# Patient Record
Sex: FEMALE | Race: BLACK OR AFRICAN AMERICAN | NOT HISPANIC OR LATINO | Employment: FULL TIME | URBAN - METROPOLITAN AREA
[De-identification: names, ages, dates, MRNs, and addresses within clinical notes are randomized per-mention and may not be internally consistent; named-entity substitution may affect disease eponyms.]

---

## 2017-06-21 ENCOUNTER — RECORDS - HEALTHEAST (OUTPATIENT)
Dept: LAB | Facility: CLINIC | Age: 62
End: 2017-06-21

## 2017-06-21 LAB
CHOLEST SERPL-MCNC: 207 MG/DL
FASTING STATUS PATIENT QL REPORTED: ABNORMAL
HDLC SERPL-MCNC: 54 MG/DL
LDLC SERPL CALC-MCNC: 145 MG/DL
TRIGL SERPL-MCNC: 39 MG/DL

## 2017-07-05 RX ORDER — ALBUTEROL SULFATE 90 UG/1
2 AEROSOL, METERED RESPIRATORY (INHALATION) EVERY 4 HOURS PRN
COMMUNITY

## 2017-07-05 RX ORDER — ALBUTEROL SULFATE 1.25 MG/3ML
1 SOLUTION RESPIRATORY (INHALATION) EVERY 6 HOURS PRN
COMMUNITY

## 2017-07-06 ENCOUNTER — ANESTHESIA (OUTPATIENT)
Dept: SURGERY | Facility: CLINIC | Age: 62
End: 2017-07-06
Payer: COMMERCIAL

## 2017-07-06 ENCOUNTER — ANESTHESIA EVENT (OUTPATIENT)
Dept: SURGERY | Facility: CLINIC | Age: 62
End: 2017-07-06
Payer: COMMERCIAL

## 2017-07-06 ENCOUNTER — SURGERY (OUTPATIENT)
Age: 62
End: 2017-07-06

## 2017-07-06 ENCOUNTER — HOSPITAL ENCOUNTER (OUTPATIENT)
Facility: CLINIC | Age: 62
Discharge: HOME | End: 2017-07-06
Attending: OPHTHALMOLOGY | Admitting: OPHTHALMOLOGY
Payer: COMMERCIAL

## 2017-07-06 VITALS
SYSTOLIC BLOOD PRESSURE: 126 MMHG | TEMPERATURE: 98 F | WEIGHT: 184 LBS | HEIGHT: 62 IN | OXYGEN SATURATION: 98 % | DIASTOLIC BLOOD PRESSURE: 75 MMHG | HEART RATE: 69 BPM | BODY MASS INDEX: 33.86 KG/M2 | RESPIRATION RATE: 16 BRPM

## 2017-07-06 DIAGNOSIS — H25.11 AGE-RELATED NUCLEAR CATARACT OF RIGHT EYE: Primary | ICD-10-CM

## 2017-07-06 PROCEDURE — 71000028 ZZH EYE RECOVERY PHASE 2 EACH 15 MINS: Performed by: OPHTHALMOLOGY

## 2017-07-06 PROCEDURE — 37000008 ZZH ANESTHESIA TECHNICAL FEE, 1ST 30 MIN: Performed by: OPHTHALMOLOGY

## 2017-07-06 PROCEDURE — 40000170 ZZH STATISTIC PRE-PROCEDURE ASSESSMENT II: Performed by: OPHTHALMOLOGY

## 2017-07-06 PROCEDURE — V2632 POST CHMBR INTRAOCULAR LENS: HCPCS | Performed by: OPHTHALMOLOGY

## 2017-07-06 PROCEDURE — 25000128 H RX IP 250 OP 636: Performed by: ANESTHESIOLOGY

## 2017-07-06 PROCEDURE — 36000104 ZZH EYE SURGERY LEVEL 4 1ST 30 MIN: Performed by: OPHTHALMOLOGY

## 2017-07-06 PROCEDURE — 25000128 H RX IP 250 OP 636: Performed by: OPHTHALMOLOGY

## 2017-07-06 PROCEDURE — 37000009 ZZH ANESTHESIA TECHNICAL FEE, EACH ADDTL 15 MIN: Performed by: OPHTHALMOLOGY

## 2017-07-06 PROCEDURE — 36000105 ZZH EYE SURGERY LEVEL 4 EA 15 ADDTL MIN: Performed by: OPHTHALMOLOGY

## 2017-07-06 PROCEDURE — 27210995 ZZH RX 272: Performed by: OPHTHALMOLOGY

## 2017-07-06 PROCEDURE — 25000125 ZZHC RX 250: Performed by: ANESTHESIOLOGY

## 2017-07-06 PROCEDURE — 27210794 ZZH OR GENERAL SUPPLY STERILE: Performed by: OPHTHALMOLOGY

## 2017-07-06 PROCEDURE — 25000128 H RX IP 250 OP 636: Performed by: NURSE ANESTHETIST, CERTIFIED REGISTERED

## 2017-07-06 PROCEDURE — 66711 ECP CILIARY BODY DESTRUCTION: CPT | Performed by: OPHTHALMOLOGY

## 2017-07-06 PROCEDURE — 25000125 ZZHC RX 250: Performed by: OPHTHALMOLOGY

## 2017-07-06 PROCEDURE — 25000132 ZZH RX MED GY IP 250 OP 250 PS 637: Performed by: OPHTHALMOLOGY

## 2017-07-06 DEVICE — EYE IMP IOL ALCON PCL SN60WF ACRYSOF IQ 21.0: Type: IMPLANTABLE DEVICE | Site: EYE | Status: FUNCTIONAL

## 2017-07-06 RX ORDER — FENTANYL CITRATE 50 UG/ML
INJECTION, SOLUTION INTRAMUSCULAR; INTRAVENOUS PRN
Status: DISCONTINUED | OUTPATIENT
Start: 2017-07-06 | End: 2017-07-06

## 2017-07-06 RX ORDER — TROPICAMIDE 10 MG/ML
1 SOLUTION/ DROPS OPHTHALMIC
Status: COMPLETED | OUTPATIENT
Start: 2017-07-06 | End: 2017-07-06

## 2017-07-06 RX ORDER — BALANCED SALT SOLUTION 6.4; .75; .48; .3; 3.9; 1.7 MG/ML; MG/ML; MG/ML; MG/ML; MG/ML; MG/ML
SOLUTION OPHTHALMIC PRN
Status: DISCONTINUED | OUTPATIENT
Start: 2017-07-06 | End: 2017-07-06 | Stop reason: HOSPADM

## 2017-07-06 RX ORDER — SODIUM CHLORIDE, SODIUM LACTATE, POTASSIUM CHLORIDE, CALCIUM CHLORIDE 600; 310; 30; 20 MG/100ML; MG/100ML; MG/100ML; MG/100ML
500 INJECTION, SOLUTION INTRAVENOUS CONTINUOUS
Status: DISCONTINUED | OUTPATIENT
Start: 2017-07-06 | End: 2017-07-06 | Stop reason: HOSPADM

## 2017-07-06 RX ORDER — ACETAMINOPHEN 325 MG/1
650 TABLET ORAL ONCE
Status: COMPLETED | OUTPATIENT
Start: 2017-07-06 | End: 2017-07-06

## 2017-07-06 RX ORDER — ONDANSETRON 2 MG/ML
INJECTION INTRAMUSCULAR; INTRAVENOUS PRN
Status: DISCONTINUED | OUTPATIENT
Start: 2017-07-06 | End: 2017-07-06

## 2017-07-06 RX ORDER — PHENYLEPHRINE HYDROCHLORIDE 25 MG/ML
1 SOLUTION/ DROPS OPHTHALMIC
Status: DISCONTINUED | OUTPATIENT
Start: 2017-07-06 | End: 2017-07-06 | Stop reason: HOSPADM

## 2017-07-06 RX ORDER — TETRACAINE HYDROCHLORIDE 5 MG/ML
SOLUTION OPHTHALMIC PRN
Status: DISCONTINUED | OUTPATIENT
Start: 2017-07-06 | End: 2017-07-06 | Stop reason: HOSPADM

## 2017-07-06 RX ORDER — OFLOXACIN 3 MG/ML
1 SOLUTION/ DROPS OPHTHALMIC 4 TIMES DAILY
Qty: 1 BOTTLE | Refills: 0 | Status: SHIPPED | OUTPATIENT
Start: 2017-07-06

## 2017-07-06 RX ORDER — PREDNISOLONE ACETATE 10 MG/ML
1 SUSPENSION/ DROPS OPHTHALMIC 4 TIMES DAILY
Qty: 6 ML | Refills: 0 | Status: SHIPPED | OUTPATIENT
Start: 2017-07-06 | End: 2017-08-05

## 2017-07-06 RX ORDER — LIDOCAINE HYDROCHLORIDE 10 MG/ML
INJECTION, SOLUTION EPIDURAL; INFILTRATION; INTRACAUDAL; PERINEURAL PRN
Status: DISCONTINUED | OUTPATIENT
Start: 2017-07-06 | End: 2017-07-06 | Stop reason: HOSPADM

## 2017-07-06 RX ORDER — TRIAMCINOLONE ACETONIDE 40 MG/ML
INJECTION, SUSPENSION INTRA-ARTICULAR; INTRAMUSCULAR PRN
Status: DISCONTINUED | OUTPATIENT
Start: 2017-07-06 | End: 2017-07-06 | Stop reason: HOSPADM

## 2017-07-06 RX ORDER — SODIUM CHLORIDE, SODIUM LACTATE, POTASSIUM CHLORIDE, CALCIUM CHLORIDE 600; 310; 30; 20 MG/100ML; MG/100ML; MG/100ML; MG/100ML
INJECTION, SOLUTION INTRAVENOUS CONTINUOUS
Status: DISCONTINUED | OUTPATIENT
Start: 2017-07-06 | End: 2017-07-06 | Stop reason: HOSPADM

## 2017-07-06 RX ORDER — TROPICAMIDE 10 MG/ML
1 SOLUTION/ DROPS OPHTHALMIC
Status: DISCONTINUED | OUTPATIENT
Start: 2017-07-06 | End: 2017-07-06 | Stop reason: HOSPADM

## 2017-07-06 RX ORDER — DEXAMETHASONE SODIUM PHOSPHATE 4 MG/ML
INJECTION, SOLUTION INTRA-ARTICULAR; INTRALESIONAL; INTRAMUSCULAR; INTRAVENOUS; SOFT TISSUE PRN
Status: DISCONTINUED | OUTPATIENT
Start: 2017-07-06 | End: 2017-07-06 | Stop reason: HOSPADM

## 2017-07-06 RX ADMIN — TETRACAINE HYDROCHLORIDE 2 DROP: 5 SOLUTION OPHTHALMIC at 11:15

## 2017-07-06 RX ADMIN — EPINEPHRINE 500 ML: 1 INJECTION, SOLUTION, CONCENTRATE INTRAVENOUS at 10:51

## 2017-07-06 RX ADMIN — DEXAMETHASONE SODIUM PHOSPHATE 2 MG: 4 INJECTION, SOLUTION INTRA-ARTICULAR; INTRALESIONAL; INTRAMUSCULAR; INTRAVENOUS; SOFT TISSUE at 10:52

## 2017-07-06 RX ADMIN — CEFTAZIDIME 0.5 ML: 1 INJECTION, POWDER, FOR SOLUTION INTRAMUSCULAR; INTRAVENOUS at 10:52

## 2017-07-06 RX ADMIN — SODIUM CHLORIDE, POTASSIUM CHLORIDE, SODIUM LACTATE AND CALCIUM CHLORIDE 500 ML: 600; 310; 30; 20 INJECTION, SOLUTION INTRAVENOUS at 08:52

## 2017-07-06 RX ADMIN — TRIAMCINOLONE ACETONIDE 4 MG: 40 INJECTION, SUSPENSION INTRA-ARTICULAR; INTRAMUSCULAR at 11:11

## 2017-07-06 RX ADMIN — NEOMYCIN SULFATE, POLYMYXIN B SULFATE, AND DEXAMETHASONE 1 TUBE: 3.5; 10000; 1 OINTMENT OPHTHALMIC at 11:16

## 2017-07-06 RX ADMIN — TROPICAMIDE 1 DROP: 10 SOLUTION/ DROPS OPHTHALMIC at 08:50

## 2017-07-06 RX ADMIN — FENTANYL CITRATE 25 MCG: 50 INJECTION, SOLUTION INTRAMUSCULAR; INTRAVENOUS at 10:38

## 2017-07-06 RX ADMIN — MIDAZOLAM HYDROCHLORIDE 2 MG: 1 INJECTION, SOLUTION INTRAMUSCULAR; INTRAVENOUS at 10:29

## 2017-07-06 RX ADMIN — LIDOCAINE HYDROCHLORIDE 1 ML: 10 INJECTION, SOLUTION EPIDURAL; INFILTRATION; INTRACAUDAL; PERINEURAL at 11:16

## 2017-07-06 RX ADMIN — TROPICAMIDE 1 DROP: 10 SOLUTION/ DROPS OPHTHALMIC at 08:39

## 2017-07-06 RX ADMIN — LIDOCAINE HYDROCHLORIDE 1 ML: 10 INJECTION, SOLUTION EPIDURAL; INFILTRATION; INTRACAUDAL; PERINEURAL at 08:52

## 2017-07-06 RX ADMIN — FENTANYL CITRATE 50 MCG: 50 INJECTION, SOLUTION INTRAMUSCULAR; INTRAVENOUS at 10:34

## 2017-07-06 RX ADMIN — PHENYLEPHRINE HYDROCHLORIDE 1 DROP: 2.5 SOLUTION/ DROPS OPHTHALMIC at 08:39

## 2017-07-06 RX ADMIN — ONDANSETRON 4 MG: 2 INJECTION INTRAMUSCULAR; INTRAVENOUS at 10:36

## 2017-07-06 RX ADMIN — TROPICAMIDE 1 DROP: 10 SOLUTION/ DROPS OPHTHALMIC at 08:44

## 2017-07-06 RX ADMIN — ACETAMINOPHEN 650 MG: 325 TABLET, FILM COATED ORAL at 11:28

## 2017-07-06 RX ADMIN — PHENYLEPHRINE HYDROCHLORIDE 1 DROP: 2.5 SOLUTION/ DROPS OPHTHALMIC at 08:50

## 2017-07-06 RX ADMIN — Medication 0.8 ML: at 11:15

## 2017-07-06 RX ADMIN — PHENYLEPHRINE HYDROCHLORIDE 1 DROP: 2.5 SOLUTION/ DROPS OPHTHALMIC at 08:44

## 2017-07-06 RX ADMIN — BALANCED SALT SOLUTION 15 ML: 6.4; .75; .48; .3; 3.9; 1.7 SOLUTION OPHTHALMIC at 10:51

## 2017-07-06 ASSESSMENT — LIFESTYLE VARIABLES: TOBACCO_USE: 0

## 2017-07-06 ASSESSMENT — COPD QUESTIONNAIRES: COPD: 0

## 2017-07-06 NOTE — IP AVS SNAPSHOT
[facility]    [address]    [place] [address]    Phone:  [phone]    Fax:  [phone]                                       After Visit Summary   7/6/2017    Guillermina Lopez    MRN: [id number]           After Visit Summary Signature Page     I have received my discharge instructions, and my questions have been answered. I have discussed any challenges I see with this plan with the nurse or doctor.    ..........................................................................................................................................  Patient/Patient Representative Signature      ..........................................................................................................................................  Patient Representative Print Name and Relationship to Patient    ..................................................               ................................................  Date                                            Time    ..........................................................................................................................................  Reviewed by Signature/Title    ...................................................              ..............................................  Date                                                            Time

## 2017-07-06 NOTE — ANESTHESIA POSTPROCEDURE EVALUATION
Patient: Guillermina Lopez    Procedure(s):  RIGHT EYE PHACOEMULSIFICATION CLEAR CORNEA WITH STANDARD INTRAOCULAR LENS IMPLANT; ENDOSCOPIC CYCLOPHOTOCOAGULATION (MAC RETROBULBAR) - Wound Class: I-Clean    Diagnosis:CATARACT  Diagnosis Additional Information: No value filed.    Anesthesia Type:  MAC    Note:  Anesthesia Post Evaluation    Patient location during evaluation: PACU  Patient participation: Able to fully participate in evaluation  Level of consciousness: awake and alert  Pain management: adequate  Airway patency: patent  Cardiovascular status: acceptable  Respiratory status: acceptable  Hydration status: acceptable  PONV: none     Anesthetic complications: None          Last vitals:  Vitals:    07/06/17 0846 07/06/17 1117 07/06/17 1142   BP: 117/84 133/79 126/75   Pulse: 69     Resp: 14 16 16   Temp: 36.7  C (98  F)     SpO2: 100% 98% 98%         Electronically Signed By: Robbie Benz MD  July 6, 2017  3:29 PM

## 2017-07-06 NOTE — ANESTHESIA CARE TRANSFER NOTE
Patient: Guillermina Lopez    Procedure(s):  RIGHT EYE PHACOEMULSIFICATION CLEAR CORNEA WITH STANDARD INTRAOCULAR LENS IMPLANT; ENDOSCOPIC CYCLOPHOTOCOAGULATION (MAC RETROBULBAR) - Wound Class: I-Clean    Diagnosis: CATARACT  Diagnosis Additional Information: No value filed.    Anesthesia Type:   MAC     Note:  Airway :Room Air  Patient transferred to:PACU  Comments: Pt exhibits spont resps, all monitors and alarms on in pacu, report given to RN, vss.      Vitals: (Last set prior to Anesthesia Care Transfer)    CRNA VITALS  7/6/2017 1041 - 7/6/2017 1116      7/6/2017             NIBP: 127/74    Pulse: 66    NIBP Mean: 90    Ht Rate: 65    SpO2: 99 %    Resp Rate (set): 10                Electronically Signed By: SOURAV Ohara CRNA  July 6, 2017  11:16 AM

## 2017-07-06 NOTE — ANESTHESIA PREPROCEDURE EVALUATION
Anesthesia Evaluation     . Pt has had prior anesthetic.     No history of anesthetic complications          ROS/MED HX    ENT/Pulmonary:     (+)Intermittent asthma , . .   (-) tobacco use, COPD and sleep apnea   Neurologic:       Cardiovascular:     (+) Dyslipidemia, ----. : . . . :. .      (-) CAD   METS/Exercise Tolerance:     Hematologic:         Musculoskeletal:         GI/Hepatic:        (-) GERD and liver disease   Renal/Genitourinary:      (-) renal disease   Endo:      (-) Type I DM and Type II DM   Psychiatric:         Infectious Disease:         Malignancy:         Other:                     Physical Exam  Normal systems: cardiovascular, pulmonary and dental    Airway   Mallampati: II  TM distance: >3 FB  Neck ROM: full    Dental   (+) caps    Cardiovascular       Pulmonary                     Anesthesia Plan      History & Physical Review  History and physical reviewed and following examination; no interval change.    ASA Status:  2 .    NPO Status:  > 8 hours    Plan for MAC with Intravenous induction. Reason for MAC:  Procedure to face, neck, head or breast  PONV prophylaxis:  Ondansetron (or other 5HT-3)       Postoperative Care      Consents  Anesthetic plan, risks, benefits and alternatives discussed with:  Patient..                          .

## 2017-07-06 NOTE — IP AVS SNAPSHOT
MRN:[id number]                      After Visit Summary   7/6/2017    Guillermina Lopez    MRN: [id number]           Thank you!     Thank you for choosing [facility] for your care. Our goal is always to provide you with excellent care. Hearing back from our patients is one way we can continue to improve our services. Please take a few minutes to complete the written survey that you may receive in the mail after you visit with us. Thank you!        Patient Information     Date Of Birth          1955        About your hospital stay     You were admitted on:  July 6, 2017 You last received care in the:  [facility]    You were discharged on:  July 6, 2017       Who to Call     For medical emergencies, please call 911.  For non-urgent questions about your medical care, please call your primary care provider or clinic, [phone]  For questions related to your surgery, please call your surgery clinic        Attending Provider     Provider Specialty    Marlee Carroll MD Ophthalmology       Primary Care Provider Office Phone # Fax #    Clifford Chavez [phone]      Further instructions from your care team           Discharge Instructions  Post-Operative Glaucoma Surgery  Dr. Carroll  [phone]    Pain Management:      Some mild discomfort is normal following surgery.  If you are currently taking any medications for pain, you may continue to take what you normally do.  Otherwise, you may take an over-the-counter medication such as Tylenol (acetaminophen) or ibuprofen (Advil) for relief.  Take as instructed on the bottle.    If you are experiencing uncontrollable pain or have other concerns, call [phone].    Other Medications:    No eye drops to the operative eye tonight.  Please remember to bring all your drops/supplies with you to your post-op appointment.  You will be instructed on the drops at that time.    Do not take glaucoma pills.    You  "may resume your regular home medications, including any drops you are using in your NON-operative eye.      General Rules:    Avoid strenuous activity. Do not do anything that would cause you to strain or make your face red. Open your mouth if you cough, use a laxative if necessary, do not lift greater than 5 pounds.    Keep your head above your heart.  Do not bend down lower than waist level.  Bend with your knees.    Keep the bandage over your eye Until 6pm tonight.    Try to write down any questions you may have to bring to your post-op appointment.    Be restful today.      [facility] Discharge  Instructions  Anesthesia ([facility] Care Center)   Adult Discharge Instructions    For 24 hours after surgery    1. Get plenty of rest.  Make arrangements to have a responsible adult stay with you for at least 6 hours after you leave the hospital.  2. Do not drive or use heavy equipment for 24 hours.    3. Do not drink alcohol for 24 hours.  4. Do not sign legal documents or make important decisions for 24 hours.  5. Avoid strenuous or risky activities. You may feel lightheaded.  If so, sit for a few minutes before standing.  Have someone help you get up.   6. Conscious sedation patients may resume a regular diet..  7. Any questions of medical nature, call your physician.    Pending Results     No orders found from 7/4/2017 to 7/7/2017.            Admission Information     Date & Time Provider Department Dept. Phone    7/6/2017 Marlee Carroll MD [facility] [place] [phone]      Your Vitals Were     Blood Pressure Pulse Temperature Respirations Height Weight    133/79 69 98  F (36.7  C) (Temporal) 16 1.581 m (5' 2.24\") 83.5 kg (184 lb)    Pulse Oximetry BMI (Body Mass Index)                98% 33.39 kg/m2          MyChart Information     [employer] lets you send messages to your doctor, view your test results, renew your prescriptions, schedule appointments and more. To " "sign up, go to [email or web address] . Click on \"Log in\" on the left side of the screen, which will take you to the Welcome page. Then click on \"Sign up Now\" on the right side of the page.     You will be asked to enter the access code listed below, as well as some personal information. Please follow the directions to create your username and password.     Your access code is: [email or web address]  Expires: 10/4/2017 11:25 AM     Your access code will  in 90 days. If you need help or a new code, please call your [facility] clinic or [phone].        Care EveryWhere ID     This is your Care EveryWhere ID. This could be used by other organizations to access your [facility] medical records  [id number]        Equal Access to Services     CARLOTTA OSORIO : Sidney Blanco, edward hirsch, kevin chowdary, jed ricketts . So [facility] [phone].    ATENCIÓN: Si habla español, tiene a castillo disposición servicios gratuitos de asistencia lingüística. Neha al [phone].    We comply with applicable federal civil rights laws and [place] laws. We do not discriminate on the basis of race, color, national origin, age, disability sex, sexual orientation or gender identity.               Review of your medicines      UNREVIEWED medicines. Ask your doctor about these medicines        Dose / Directions    * albuterol 108 (90 BASE) MCG/ACT Inhaler   Commonly known as:  PROAIR HFA/PROVENTIL HFA/VENTOLIN HFA        Dose:  2 puff   Inhale 2 puffs into the lungs every 4 hours as needed for shortness of breath / dyspnea or wheezing   Refills:  0       * albuterol 1.25 MG/3ML nebulizer solution   Commonly known as:  ACCUNEB        Dose:  1 vial   Take 1 vial by nebulization every 6 hours as needed for shortness of breath / dyspnea or wheezing   Refills:  0       mometasone 220 MCG/INH Inhaler   Commonly known as:  ASMANEX        Inhale into the lungs every evening   Refills:  0       " SINGULAIR PO        Dose:  10 mg   Take 10 mg by mouth At Bedtime   Refills:  0       ZOCOR PO        Dose:  20 mg   Take 20 mg by mouth At Bedtime   Refills:  0       * Notice:  This list has 2 medication(s) that are the same as other medications prescribed for you. Read the directions carefully, and ask your doctor or other care provider to review them with you.      START taking        Dose / Directions    ofloxacin 0.3 % ophthalmic solution   Commonly known as:  OCUFLOX   Used for:  Age-related nuclear cataract of right eye        Dose:  1 drop   Place 1 drop into the right eye 4 times daily   Quantity:  1 Bottle   Refills:  0       prednisoLONE acetate 1 % ophthalmic susp   Commonly known as:  PRED FORTE   Used for:  Age-related nuclear cataract of right eye        Dose:  1 drop   Place 1 drop into the right eye 4 times daily   Quantity:  6 mL   Refills:  0            Where to get your medicines      These medications were sent to [facility] - [address]  [address], [place] [address]     Phone:  [phone]     ofloxacin 0.3 % ophthalmic solution    prednisoLONE acetate 1 % ophthalmic susp                Protect others around you: Learn how to safely use, store and throw away your medicines at www.disposemymeds.org.             Medication List: This is a list of all your medications and when to take them. Check marks below indicate your daily home schedule. Keep this list as a reference.      Medications           Morning Afternoon Evening Bedtime As Needed    * albuterol 108 (90 BASE) MCG/ACT Inhaler   Commonly known as:  PROAIR HFA/PROVENTIL HFA/VENTOLIN HFA   Inhale 2 puffs into the lungs every 4 hours as needed for shortness of breath / dyspnea or wheezing                                * albuterol 1.25 MG/3ML nebulizer solution   Commonly known as:  ACCUNEB   Take 1 vial by nebulization every 6 hours as needed for shortness of breath / dyspnea or wheezing                                 mometasone 220 MCG/INH Inhaler   Commonly known as:  ASMANEX   Inhale into the lungs every evening                                ofloxacin 0.3 % ophthalmic solution   Commonly known as:  OCUFLOX   Place 1 drop into the right eye 4 times daily                                prednisoLONE acetate 1 % ophthalmic susp   Commonly known as:  PRED FORTE   Place 1 drop into the right eye 4 times daily                                SINGULAIR PO   Take 10 mg by mouth At Bedtime                                ZOCOR PO   Take 20 mg by mouth At Bedtime                                * Notice:  This list has 2 medication(s) that are the same as other medications prescribed for you. Read the directions carefully, and ask your doctor or other care provider to review them with you.

## 2017-07-06 NOTE — DISCHARGE INSTRUCTIONS
Discharge Instructions  Post-Operative Glaucoma Surgery  Dr. Carroll  [phone]    Pain Management:      Some mild discomfort is normal following surgery.  If you are currently taking any medications for pain, you may continue to take what you normally do.  Otherwise, you may take an over-the-counter medication such as Tylenol (acetaminophen) or ibuprofen (Advil) for relief.  Take as instructed on the bottle.    If you are experiencing uncontrollable pain or have other concerns, call [phone].    Other Medications:    No eye drops to the operative eye tonight.  Please remember to bring all your drops/supplies with you to your post-op appointment.  You will be instructed on the drops at that time.    Do not take glaucoma pills.    You may resume your regular home medications, including any drops you are using in your NON-operative eye.      General Rules:    Avoid strenuous activity. Do not do anything that would cause you to strain or make your face red. Open your mouth if you cough, use a laxative if necessary, do not lift greater than 5 pounds.    Keep your head above your heart.  Do not bend down lower than waist level.  Bend with your knees.    Keep the bandage over your eye Until 6pm tonight.    Try to write down any questions you may have to bring to your post-op appointment.    Be restful today.      [facility] Discharge  Instructions  Anesthesia (Eye Care Center)   Adult Discharge Instructions    For 24 hours after surgery    1. Get plenty of rest.  Make arrangements to have a responsible adult stay with you for at least 6 hours after you leave the hospital.  2. Do not drive or use heavy equipment for 24 hours.    3. Do not drink alcohol for 24 hours.  4. Do not sign legal documents or make important decisions for 24 hours.  5. Avoid strenuous or risky activities. You may feel lightheaded.  If so, sit for a few minutes before standing.  Have someone help you get  up.   6. Conscious sedation patients may resume a regular diet..  7. Any questions of medical nature, call your physician.

## 2017-07-06 NOTE — OP NOTE
Patient:    Guillermina Lopez                                                  Reg No:     [id number]                                                   Date:  2017                                                  :   1955                                                      Attending:  MINDY CALDERON M.D.                                                    Surgeon:    MINDY CALDERON M.D.                                      Service Dt: Ophthalmology                                               OPERATIVE REPORT          ANESTHESIA:   Monitored anesthetic care with Sub-Tenon block of mixture of 2 cc of 2% lidocaine and 2 cc of 0.75% marcaine and hyaluronidase and topical tetracaine and intracameral preservative free lidocaine 1%      PREOPERATIVE DIAGNOSIS (ES):   1. Cataract, right eye  2. Plateau iris, right eye       POSTOPERATIVE DIAGNOSIS (ES):   The same     NAME OF OPERATION:   1. Phacoemulsification and intraocular lens implant, right eye  IOL model: [id number]  IOl power: 21.0 D  IOL serial number: [id number]    2. Endoscopic cyclophotocoagulation, right eye, settings 0.25/continous mode/ 270 degree      COMPLICATIONS:  None    Blood loss:  None    SPECIMENS REMOVED:  None    INDICATIONS FOR PROCEDURE:  The patient is a 62 year old female. The patient was evaluated in the office and diagnosed with visually significant cataract in the  right eye.  The benefits, alternatives, and risks of the procedure were discussed with the patient, including the risk of infection, inflammation, bleeding, blindness, need for another procedure, and elevated intraocular pressure, need for additional glaucoma eye drops or surgery. The patient understood the benefits, alternatives and risks and has elected to proceed with the surgery.     PROCEDURE: After appropriate pre-operative consent was obtained the patient was brought to the OR and anesthesia team hooked up monitoring. The time out  was taken to identify the patient, surgery, the implant and eye. The biometry and IOL calculations of the operative eye done in 2017 were reviewed and appropriate IOL was selected. After that the anesthesia team induced MAC. The eye was prepped and draped in sterile ophthalmic fashion. Tetracaine drops were placed in the eye. The lid speculum was placed in the eye. Sub-Tenon block was given in standard fashion. Paracentesis was performed with paracentesis knife. The AC was filled with preservative free lidocaine 1% and Viscoat. The main wound was constructed temporally with 2.4 mm keratome. The capsulorhexis was started with a cystotome and completed with Utratta forcepts. Hydrodissection was performed with a blunt tip cannula and balanced salt solution. Phacoemulsification was used to remove the nucleous. Irrigation/aspiration were used to remove the cortex. The capsular bag was re-filled with Provisc. The intraocular lens was injected into the capsular bag. Irrigation/aspiration were used to remove viscoelastic. Sulcus was filled with amniovisc. ECP done in standard fashion 2670 degree area were treated. After that amniovisc was removed with I&A.   Both wounds were hydrated and checked for leak using weck-cells. Ressue glue was placed on the corneal wounds. AC was re-formed with balanced salt solution.   At the end of the surgery the AC was deep, and no leak was identified. Dexamethazone and Ceftazidime were injected into inferior subconjunctival space. The lid speculum was removed. Maxitrol ointment was placed in the eye. The eye was patched and shielded in standard ophthalmic fashion.     DISPOSITION: The patient was taken to the recovery room in stable condition having tolerated the procedure well. The patient will be given post-operative instructions and seen in the eye clinic tomorrow.      SPONGE/INSTRUMENT/NEEDLE COUNTS:   All sponge and needle counts were correct at the end of the case.      ____________________________   Marlee CALDERON M.D.

## 2017-09-07 ENCOUNTER — ANESTHESIA (OUTPATIENT)
Dept: SURGERY | Facility: CLINIC | Age: 62
End: 2017-09-07
Payer: COMMERCIAL

## 2017-09-07 ENCOUNTER — HOSPITAL ENCOUNTER (OUTPATIENT)
Facility: CLINIC | Age: 62
Discharge: HOME | End: 2017-09-07
Attending: OPHTHALMOLOGY | Admitting: OPHTHALMOLOGY
Payer: COMMERCIAL

## 2017-09-07 ENCOUNTER — ANESTHESIA EVENT (OUTPATIENT)
Dept: SURGERY | Facility: CLINIC | Age: 62
End: 2017-09-07
Payer: COMMERCIAL

## 2017-09-07 ENCOUNTER — SURGERY (OUTPATIENT)
Age: 62
End: 2017-09-07

## 2017-09-07 VITALS
DIASTOLIC BLOOD PRESSURE: 83 MMHG | TEMPERATURE: 96.9 F | SYSTOLIC BLOOD PRESSURE: 127 MMHG | BODY MASS INDEX: 34.04 KG/M2 | RESPIRATION RATE: 16 BRPM | OXYGEN SATURATION: 96 % | WEIGHT: 185 LBS | HEIGHT: 62 IN

## 2017-09-07 DIAGNOSIS — H25.12 AGE-RELATED NUCLEAR CATARACT OF LEFT EYE: Primary | ICD-10-CM

## 2017-09-07 DIAGNOSIS — H21.82: ICD-10-CM

## 2017-09-07 PROCEDURE — 27210995 ZZH RX 272: Performed by: OPHTHALMOLOGY

## 2017-09-07 PROCEDURE — 37000009 ZZH ANESTHESIA TECHNICAL FEE, EACH ADDTL 15 MIN: Performed by: OPHTHALMOLOGY

## 2017-09-07 PROCEDURE — 27210794 ZZH OR GENERAL SUPPLY STERILE: Performed by: OPHTHALMOLOGY

## 2017-09-07 PROCEDURE — 40000170 ZZH STATISTIC PRE-PROCEDURE ASSESSMENT II: Performed by: OPHTHALMOLOGY

## 2017-09-07 PROCEDURE — 25000125 ZZHC RX 250: Performed by: ANESTHESIOLOGY

## 2017-09-07 PROCEDURE — 25000128 H RX IP 250 OP 636: Performed by: OPHTHALMOLOGY

## 2017-09-07 PROCEDURE — 66711 ECP CILIARY BODY DESTRUCTION: CPT | Performed by: OPHTHALMOLOGY

## 2017-09-07 PROCEDURE — 37000008 ZZH ANESTHESIA TECHNICAL FEE, 1ST 30 MIN: Performed by: OPHTHALMOLOGY

## 2017-09-07 PROCEDURE — 36000105 ZZH EYE SURGERY LEVEL 4 EA 15 ADDTL MIN: Performed by: OPHTHALMOLOGY

## 2017-09-07 PROCEDURE — V2632 POST CHMBR INTRAOCULAR LENS: HCPCS | Performed by: OPHTHALMOLOGY

## 2017-09-07 PROCEDURE — 25000125 ZZHC RX 250: Performed by: OPHTHALMOLOGY

## 2017-09-07 PROCEDURE — 36000104 ZZH EYE SURGERY LEVEL 4 1ST 30 MIN: Performed by: OPHTHALMOLOGY

## 2017-09-07 PROCEDURE — 25000125 ZZHC RX 250: Performed by: NURSE ANESTHETIST, CERTIFIED REGISTERED

## 2017-09-07 PROCEDURE — 25000128 H RX IP 250 OP 636: Performed by: NURSE ANESTHETIST, CERTIFIED REGISTERED

## 2017-09-07 PROCEDURE — 71000028 ZZH EYE RECOVERY PHASE 2 EACH 15 MINS: Performed by: OPHTHALMOLOGY

## 2017-09-07 PROCEDURE — S0020 INJECTION, BUPIVICAINE HYDRO: HCPCS | Performed by: OPHTHALMOLOGY

## 2017-09-07 DEVICE — EYE IMP IOL ALCON PCL SN60WF ACRYSOF IQ 21.0: Type: IMPLANTABLE DEVICE | Site: EYE | Status: FUNCTIONAL

## 2017-09-07 RX ORDER — PREDNISOLONE ACETATE 10 MG/ML
1 SUSPENSION/ DROPS OPHTHALMIC 4 TIMES DAILY
Qty: 6 ML | Refills: 0 | Status: SHIPPED | OUTPATIENT
Start: 2017-09-07 | End: 2017-10-07

## 2017-09-07 RX ORDER — SODIUM CHLORIDE, SODIUM LACTATE, POTASSIUM CHLORIDE, CALCIUM CHLORIDE 600; 310; 30; 20 MG/100ML; MG/100ML; MG/100ML; MG/100ML
INJECTION, SOLUTION INTRAVENOUS CONTINUOUS
Status: DISCONTINUED | OUTPATIENT
Start: 2017-09-07 | End: 2017-09-07 | Stop reason: HOSPADM

## 2017-09-07 RX ORDER — ONDANSETRON 2 MG/ML
INJECTION INTRAMUSCULAR; INTRAVENOUS PRN
Status: DISCONTINUED | OUTPATIENT
Start: 2017-09-07 | End: 2017-09-07

## 2017-09-07 RX ORDER — BALANCED SALT SOLUTION 6.4; .75; .48; .3; 3.9; 1.7 MG/ML; MG/ML; MG/ML; MG/ML; MG/ML; MG/ML
SOLUTION OPHTHALMIC PRN
Status: DISCONTINUED | OUTPATIENT
Start: 2017-09-07 | End: 2017-09-07 | Stop reason: HOSPADM

## 2017-09-07 RX ORDER — LIDOCAINE HYDROCHLORIDE 10 MG/ML
INJECTION, SOLUTION EPIDURAL; INFILTRATION; INTRACAUDAL; PERINEURAL PRN
Status: DISCONTINUED | OUTPATIENT
Start: 2017-09-07 | End: 2017-09-07 | Stop reason: HOSPADM

## 2017-09-07 RX ORDER — TETRACAINE HYDROCHLORIDE 5 MG/ML
SOLUTION OPHTHALMIC PRN
Status: DISCONTINUED | OUTPATIENT
Start: 2017-09-07 | End: 2017-09-07 | Stop reason: HOSPADM

## 2017-09-07 RX ORDER — MOXIFLOXACIN 5 MG/ML
1 SOLUTION/ DROPS OPHTHALMIC 4 TIMES DAILY
Qty: 1.5 ML | Refills: 0 | Status: SHIPPED | OUTPATIENT
Start: 2017-09-07 | End: 2017-09-14

## 2017-09-07 RX ORDER — TROPICAMIDE 10 MG/ML
1 SOLUTION/ DROPS OPHTHALMIC
Status: COMPLETED | OUTPATIENT
Start: 2017-09-07 | End: 2017-09-07

## 2017-09-07 RX ORDER — PHENYLEPHRINE HYDROCHLORIDE 25 MG/ML
1 SOLUTION/ DROPS OPHTHALMIC
Status: COMPLETED | OUTPATIENT
Start: 2017-09-07 | End: 2017-09-07

## 2017-09-07 RX ORDER — DEXAMETHASONE SODIUM PHOSPHATE 4 MG/ML
INJECTION, SOLUTION INTRA-ARTICULAR; INTRALESIONAL; INTRAMUSCULAR; INTRAVENOUS; SOFT TISSUE PRN
Status: DISCONTINUED | OUTPATIENT
Start: 2017-09-07 | End: 2017-09-07 | Stop reason: HOSPADM

## 2017-09-07 RX ORDER — PROPARACAINE HYDROCHLORIDE 5 MG/ML
1 SOLUTION/ DROPS OPHTHALMIC ONCE
Status: COMPLETED | OUTPATIENT
Start: 2017-09-07 | End: 2017-09-07

## 2017-09-07 RX ADMIN — PHENYLEPHRINE HYDROCHLORIDE 1 DROP: 2.5 SOLUTION/ DROPS OPHTHALMIC at 07:05

## 2017-09-07 RX ADMIN — MIDAZOLAM HYDROCHLORIDE 2 MG: 1 INJECTION, SOLUTION INTRAMUSCULAR; INTRAVENOUS at 08:23

## 2017-09-07 RX ADMIN — Medication 1 ML: at 08:56

## 2017-09-07 RX ADMIN — WATER 0.5 ML: 1 INJECTION INTRAMUSCULAR; INTRAVENOUS; SUBCUTANEOUS at 09:00

## 2017-09-07 RX ADMIN — TROPICAMIDE 1 DROP: 10 SOLUTION/ DROPS OPHTHALMIC at 07:11

## 2017-09-07 RX ADMIN — TETRACAINE HYDROCHLORIDE 2 DROP: 5 SOLUTION OPHTHALMIC at 08:58

## 2017-09-07 RX ADMIN — LIDOCAINE HYDROCHLORIDE 1 ML: 10 INJECTION, SOLUTION EPIDURAL; INFILTRATION; INTRACAUDAL; PERINEURAL at 07:24

## 2017-09-07 RX ADMIN — BALANCED SALT SOLUTION 15 ML: 6.4; .75; .48; .3; 3.9; 1.7 SOLUTION OPHTHALMIC at 08:53

## 2017-09-07 RX ADMIN — TROPICAMIDE 1 DROP: 10 SOLUTION/ DROPS OPHTHALMIC at 07:05

## 2017-09-07 RX ADMIN — NEOMYCIN SULFATE, POLYMYXIN B SULFATE, AND DEXAMETHASONE 1 TUBE: 3.5; 10000; 1 OINTMENT OPHTHALMIC at 08:55

## 2017-09-07 RX ADMIN — TROPICAMIDE 1 DROP: 10 SOLUTION/ DROPS OPHTHALMIC at 07:16

## 2017-09-07 RX ADMIN — SODIUM CHONDROITIN SULFATE / SODIUM HYALURONATE 1 ML: 0.55-0.5 INJECTION INTRAOCULAR at 08:56

## 2017-09-07 RX ADMIN — DEXAMETHASONE SODIUM PHOSPHATE 2 MG: 4 INJECTION, SOLUTION INTRA-ARTICULAR; INTRALESIONAL; INTRAMUSCULAR; INTRAVENOUS; SOFT TISSUE at 08:54

## 2017-09-07 RX ADMIN — PROPARACAINE HYDROCHLORIDE 1 DROP: 5 SOLUTION/ DROPS OPHTHALMIC at 07:05

## 2017-09-07 RX ADMIN — LIDOCAINE HYDROCHLORIDE 1.5 ML: 20 INJECTION, SOLUTION EPIDURAL; INFILTRATION; INTRACAUDAL; PERINEURAL at 08:58

## 2017-09-07 RX ADMIN — PHENYLEPHRINE HYDROCHLORIDE 1 DROP: 2.5 SOLUTION/ DROPS OPHTHALMIC at 07:11

## 2017-09-07 RX ADMIN — SODIUM CHLORIDE, POTASSIUM CHLORIDE, SODIUM LACTATE AND CALCIUM CHLORIDE: 600; 310; 30; 20 INJECTION, SOLUTION INTRAVENOUS at 07:24

## 2017-09-07 RX ADMIN — LIDOCAINE HYDROCHLORIDE 1 ML: 10 INJECTION, SOLUTION EPIDURAL; INFILTRATION; INTRACAUDAL; PERINEURAL at 08:54

## 2017-09-07 RX ADMIN — PHENYLEPHRINE HYDROCHLORIDE 1 DROP: 2.5 SOLUTION/ DROPS OPHTHALMIC at 07:16

## 2017-09-07 RX ADMIN — ONDANSETRON 4 MG: 2 INJECTION INTRAMUSCULAR; INTRAVENOUS at 08:28

## 2017-09-07 RX ADMIN — EPINEPHRINE 500 ML: 1 INJECTION, SOLUTION, CONCENTRATE INTRAVENOUS at 08:53

## 2017-09-07 RX ADMIN — DEXMEDETOMIDINE HYDROCHLORIDE 8 MCG: 100 INJECTION, SOLUTION INTRAVENOUS at 08:25

## 2017-09-07 ASSESSMENT — COPD QUESTIONNAIRES: COPD: 0

## 2017-09-07 ASSESSMENT — LIFESTYLE VARIABLES: TOBACCO_USE: 0

## 2017-09-07 NOTE — ANESTHESIA POSTPROCEDURE EVALUATION
Patient: Guillermina Lopez    Procedure(s):  LEFT EYE COMBINED PHACOEMULSIFICATION CLEAR CORNEA WITH STANDARD INTRAOCULAR LENS IMPLANT, ENDOSCOPIC CYCLOPHOTOCOAGULATION - Wound Class: I-Clean    Diagnosis:CATARACT AND GLAUCOMA   Diagnosis Additional Information: No value filed.    Anesthesia Type:  MAC    Note:  Anesthesia Post Evaluation    Patient location during evaluation: PACU  Patient participation: Able to fully participate in evaluation  Level of consciousness: awake and alert  Pain management: satisfactory to patient  Airway patency: patent  Cardiovascular status: hemodynamically stable  Respiratory status: acceptable and unassisted  Hydration status: balanced  PONV: none     Anesthetic complications: None          Last vitals:  Vitals:    09/07/17 0714 09/07/17 0902 09/07/17 0913   BP: 121/76 132/85 127/83   Resp: 16 16 16   Temp: 36.1  C (96.9  F)     SpO2: 99% 97% 96%         Electronically Signed By: Demetri Narvaez MD  September 7, 2017  1:36 PM

## 2017-09-07 NOTE — IP AVS SNAPSHOT
[facility]    [address]    [place] [address]    Phone:  [phone]    Fax:  [phone]                                       After Visit Summary   9/7/2017    Guillermina Lopez    MRN: [id number]           After Visit Summary Signature Page     I have received my discharge instructions, and my questions have been answered. I have discussed any challenges I see with this plan with the nurse or doctor.    ..........................................................................................................................................  Patient/Patient Representative Signature      ..........................................................................................................................................  Patient Representative Print Name and Relationship to Patient    ..................................................               ................................................  Date                                            Time    ..........................................................................................................................................  Reviewed by Signature/Title    ...................................................              ..............................................  Date                                                            Time

## 2017-09-07 NOTE — ANESTHESIA CARE TRANSFER NOTE
Patient: Guillermina Lopez    Procedure(s):  LEFT EYE COMBINED PHACOEMULSIFICATION CLEAR CORNEA WITH STANDARD INTRAOCULAR LENS IMPLANT, ENDOSCOPIC CYCLOPHOTOCOAGULATION - Wound Class: I-Clean    Diagnosis: CATARACT AND GLAUCOMA   Diagnosis Additional Information: No value filed.    Anesthesia Type:   MAC     Note:  Airway :Room Air  Patient transferred to:PACU      Transferred to Eye Center recovery room in recliner with armrests up, spontaneous respirations, O2 saturation maintained greater than 95% with oxygen via room air. All monitors and alarms on and functioning, clinically stable vital signs. Report given to recovery RN and questions answered. Patient alert and following verbal directions.    Electronically Signed By: SOURAV Em CRNA  September 7, 2017  9:00 AM

## 2017-09-07 NOTE — OP NOTE
Patient:    Guillermina Lopez                                                  Reg No:     [id number]                                                   Date:  2017                                                  :   1955                                                      Attending:  MINDY CALDERON M.D.                                                    Surgeon:    MINDY CALDERON M.D.                                      Service Dt: Ophthalmology                                               OPERATIVE REPORT          ANESTHESIA:   Monitored anesthetic care with Sub-Tenon block of mixture of 2 cc of 2% lidocaine and 2 cc of 0.75% marcaine and hyaluronidase and topical tetracaine and intracameral preservative free lidocaine 1%      PREOPERATIVE DIAGNOSIS (ES):   1. Cataract, left eye  2. Plateau iris, left eye     POSTOPERATIVE DIAGNOSIS (ES):   The same     NAME OF OPERATION:   1. Phacoemulsification and intraocular lens implant, left eye  IOL model: [id number]  IOl power: 21.0  IOL serial number: [id number]    2. Endoscopic cyclophotocoagulation, left eye, settings: Energy 0.3, area treated 270 degree, mode: continiuos      COMPLICATIONS:  None    Blood loss:  None    SPECIMENS REMOVED:  None    INDICATIONS FOR PROCEDURE:  The patient is a 62 year old female. The patient was evaluated in the office and diagnosed with visually significant cataract in the  left eye.  The benefits, alternatives, and risks of the procedure were discussed with the patient, including the risk of infection, inflammation, bleeding, blindness, need for another procedure, and elevated intraocular pressure, need for additional glaucoma eye drops or surgery. The patient understood the benefits, alternatives and risks and has elected to proceed with the surgery.     PROCEDURE: After appropriate pre-operative consent was obtained the patient was brought to the OR and anesthesia team hooked up  monitoring. The time out was taken to identify the patient, surgery, the implant and eye. The biometry and IOL calculations of the operative eye done in 2017 were reviewed and appropriate IOL was selected. After that the anesthesia team induced MAC. The eye was prepped and draped in sterile ophthalmic fashion. Tetracaine drops were placed in the eye. The lid speculum was placed in the eye. Sub-Tenon block was given in standard fashion. Paracentesis was performed with paracentesis knife. The AC was filled with preservative free lidocaine 1% and Viscoat. The main wound was constructed temporally with 2.4 mm keratome. The capsulorhexis was started with a cystotome and completed with Utratta forcepts. Hydrodissection was performed with a blunt tip cannula and balanced salt solution. Phacoemulsification was used to remove the nucleous. Irrigation/aspiration were used to remove the cortex. The capsular bag was re-filled with Provisc. The intraocular lens was injected into the capsular bag. Irrigation/aspiration were used to remove viscoelastic.   After that the attention was brought to ECP. AC was refilled with viscoelastic. ECP was performed using above settings. Irrigation/aspiration were used to remove viscoelastic.   Both wounds were hydrated and checked for leak using weck-cells. Tissue glue was placed on the corneal wounds. AC was re-formed with balanced salt solution.   At the end of the surgery the AC was deep, and no leak was identified. Dexamethazone and Ceftazidime were injected into inferior subconjunctival space. The lid speculum was removed. Maxitrol ointment was placed in the eye. The eye was patched and shielded in standard ophthalmic fashion.     DISPOSITION: The patient was taken to the recovery room in stable condition having tolerated the procedure well. The patient will be given post-operative instructions and seen in the eye clinic tomorrow.      SPONGE/INSTRUMENT/NEEDLE COUNTS:   All sponge and  needle counts were correct at the end of the case.     ____________________________   Marlee CALDERON M.D.

## 2017-09-07 NOTE — DISCHARGE INSTRUCTIONS
[facility] Discharge  Instructions  Anesthesia ([facility])   Adult Discharge Instructions    For 24 hours after surgery    1. Get plenty of rest.  Make arrangements to have a responsible adult stay with you for at least 6 hours after you leave the hospital.  2. Do not drive or use heavy equipment for 24 hours.    3. Do not drink alcohol for 24 hours.  4. Do not sign legal documents or make important decisions for 24 hours.  5. Avoid strenuous or risky activities. You may feel lightheaded.  If so, sit for a few minutes before standing.  Have someone help you get up.   6. Conscious sedation patients may resume a regular diet..  7. Any questions of medical nature, call your physician.        Discharge Instructions  Post-Operative Cataract Surgery  Dr. Carroll  [phone]      Pain Management:      Some mild discomfort is normal following surgery.  If you are currently taking any medications for pain, you may continue to take what you normally do.  Otherwise, you may take an over-the-counter medication such as Tylenol (acetaminophen) or ibuprofen (Advil) for relief.  Take as instructed on the bottle.    If you are experiencing uncontrollable pain or have other concerns, call [phone].    Other Medications:  Begin eye drops to the operative eye tonight at 5:00 pm when you take the dressing off. Wear the clear shield when you sleep  Get in two sets of both drops today. Tomorrow do drops four times daily till told to stop. Please remember to bring all your drops/supplies with you to your post-op appointment.  You will be instructed on the drops at that time.    If you currently take pills to treat glaucoma, continue as normal.    You may resume your regular home medications, including any drops you are using in your NON-operative eye.      General Rules:    Avoid strenuous activity. Do not do anything that would cause you to strain or make your face red. Open your mouth if you  cough, use a laxative if necessary, do not lift greater than 5 pounds.    Keep your head above your heart.  Do not bend down lower than waist level.  Bend with your knees.    Keep the bandage over your eye.  The doctor will remove the bandage at your appointment at the clinic.    Try to write down any questions you may have to bring to your post-op appointment.    Be restful today.

## 2017-09-07 NOTE — ANESTHESIA PREPROCEDURE EVALUATION
Anesthesia Evaluation     . Pt has had prior anesthetic.     No history of anesthetic complications          ROS/MED HX    ENT/Pulmonary:     (+)Intermittent asthma , . .   (-) tobacco use, COPD and sleep apnea   Neurologic:       Cardiovascular:     (+) Dyslipidemia, ----. : . . . :. .      (-) CAD   METS/Exercise Tolerance:     Hematologic:         Musculoskeletal:         GI/Hepatic:        (-) GERD and liver disease   Renal/Genitourinary:      (-) renal disease   Endo:      (-) Type I DM and Type II DM   Psychiatric:         Infectious Disease:         Malignancy:         Other:                     Physical Exam  Normal systems: cardiovascular, pulmonary and dental    Airway   Mallampati: II  TM distance: >3 FB  Neck ROM: full    Dental   (+) caps    Cardiovascular       Pulmonary                         Anesthesia Plan      History & Physical Review  History and physical reviewed and following examination; no interval change.    ASA Status:  2 .    NPO Status:  > 8 hours    Plan for MAC with Intravenous induction. Reason for MAC:  Procedure to face, neck, head or breast  PONV prophylaxis:  Ondansetron (or other 5HT-3)       Postoperative Care      Consents  Anesthetic plan, risks, benefits and alternatives discussed with:  Patient..            Procedure: Procedure(s):  COMBINED PHACOEMULSIFICATION CLEAR CORNEA WITH STANDARD INTRAOCULAR LENS IMPLANT, ENDOSCOPIC CYCLOPHOTOCOAGULATION  Preop diagnosis: CATARACT AND GLAUCOMA     No Known Allergies  Past Medical History:   Diagnosis Date     Hyperlipemia      Uncomplicated asthma      Past Surgical History:   Procedure Laterality Date     COLONOSCOPY       GYN SURGERY      TUBAL LIGATION     ORTHOPEDIC SURGERY      bunion     ORTHOPEDIC SURGERY      carpal tunnel     ORTHOPEDIC SURGERY      knee arthroplasty     PHACOEMULSIFICATION CLEAR CORNEA W/ STANDARD IOL IMPLANT, ENDOSCOPIC CYCLOPHOTOCOAGULATION, COMBINED Right 7/6/2017    Procedure: COMBINED  PHACOEMULSIFICATION CLEAR CORNEA WITH STANDARD INTRAOCULAR LENS IMPLANT, ENDOSCOPIC CYCLOPHOTOCOAGULATION;  RIGHT EYE PHACOEMULSIFICATION CLEAR CORNEA WITH STANDARD INTRAOCULAR LENS IMPLANT; ENDOSCOPIC CYCLOPHOTOCOAGULATION ;  Surgeon: Marlee Carroll MD;  Location: [facility]     Prior to Admission medications    Medication Sig Start Date End Date Taking? Authorizing Provider   ofloxacin (OCUFLOX) 0.3 % ophthalmic solution Place 1 drop into the right eye 4 times daily 7/6/17  Yes Marlee Carroll MD   albuterol (PROAIR HFA/PROVENTIL HFA/VENTOLIN HFA) 108 (90 BASE) MCG/ACT Inhaler Inhale 2 puffs into the lungs every 4 hours as needed for shortness of breath / dyspnea or wheezing   Yes Reported, Patient   Simvastatin (ZOCOR PO) Take 20 mg by mouth At Bedtime   Yes Reported, Patient   Montelukast Sodium (SINGULAIR PO) Take 10 mg by mouth At Bedtime   Yes Reported, Patient   albuterol (ACCUNEB) 1.25 MG/3ML nebulizer solution Take 1 vial by nebulization every 6 hours as needed for shortness of breath / dyspnea or wheezing    Reported, Patient   mometasone (ASMANEX) 220 MCG/INH Inhaler Inhale into the lungs every evening    Reported, Patient     Current Facility-Administered Medications Ordered in Epic   Medication Dose Route Frequency Last Rate Last Dose     lactated ringers infusion   Intravenous Continuous 10 mL/hr at 09/07/17 0724       bupivacaine 0.75% (pf) 4.5mL + lidocaine 2% (pf) 4.5mL + hyaluronidase (HYLENEX) 150 units   Retrobulbar Once         lidocaine 1 % 1 mL  1 mL Other Q1H PRN   1 mL at 09/07/17 0724     No current [facility]-ordered outpatient prescriptions on file.     Wt Readings from Last 1 Encounters:   09/07/17 83.9 kg (185 lb)     Temp Readings from Last 1 Encounters:   09/07/17 36.1  C (96.9  F) (Temporal)     BP Readings from Last 6 Encounters:   09/07/17 121/76   07/06/17 126/75     Pulse Readings from Last 4 Encounters:   07/06/17 69     Resp Readings from Last 1 Encounters:   09/07/17  16     SpO2 Readings from Last 1 Encounters:   09/07/17 99%     No results for input(s): NA, POTASSIUM, CHLORIDE, CO2, ANIONGAP, GLC, BUN, CR, BRAYDEN in the last 39023 hours.  No results for input(s): WBC, HGB, PLT in the last 52068 hours.  No results for input(s): INR in the last 08630 hours.    Invalid input(s): APTT   RECENT LABS:   ECG:   ECHO:   CXR:                    .

## 2017-09-07 NOTE — IP AVS SNAPSHOT
MRN:[id number]                      After Visit Summary   9/7/2017    Guillermina Lopez    MRN: [id number]           Thank you!     Thank you for choosing [facility] for your care. Our goal is always to provide you with excellent care. Hearing back from our patients is one way we can continue to improve our services. Please take a few minutes to complete the written survey that you may receive in the mail after you visit with us. Thank you!        Patient Information     Date Of Birth          1955        About your hospital stay     You were admitted on:  September 7, 2017 You last received care in the:  [facility]    You were discharged on:  September 7, 2017       Who to Call     For medical emergencies, please call 911.  For non-urgent questions about your medical care, please call your primary care provider or clinic, [phone]  For questions related to your surgery, please call your surgery clinic        Attending Provider     Provider Specialty    Marlee Carroll MD Ophthalmology       Primary Care Provider Office Phone # Fax #    Clifford Chavez [phone]      After Care Instructions     Eye drops as prescribed by physician.  Start drops today unless told otherwise by the physician           May use Tylenol or Advil for pain as directed by the physician           Notify Physician if you have severe headache or nausea           Remove patch per physician instruction           Return to clinic as instructed by physician           Wear eye shield or patch as directed                 Further instructions from your care team       [facility] Anesthesia [facility] Discharge  Instructions  Anesthesia ([facility] [place])   Adult Discharge Instructions    For 24 hours after surgery    1. Get plenty of rest.  Make arrangements to have a responsible adult stay with you for at least 6 hours after you leave the hospital.  2. Do not drive  or use heavy equipment for 24 hours.    3. Do not drink alcohol for 24 hours.  4. Do not sign legal documents or make important decisions for 24 hours.  5. Avoid strenuous or risky activities. You may feel lightheaded.  If so, sit for a few minutes before standing.  Have someone help you get up.   6. Conscious sedation patients may resume a regular diet..  7. Any questions of medical nature, call your physician.        Discharge Instructions  Post-Operative Cataract Surgery  Dr. Carroll  [phone]      Pain Management:      Some mild discomfort is normal following surgery.  If you are currently taking any medications for pain, you may continue to take what you normally do.  Otherwise, you may take an over-the-counter medication such as Tylenol (acetaminophen) or ibuprofen (Advil) for relief.  Take as instructed on the bottle.    If you are experiencing uncontrollable pain or have other concerns, call [phone].    Other Medications:  Begin eye drops to the operative eye tonight at 5:00 pm when you take the dressing off. Wear the clear shield when you sleep  Get in two sets of both drops today. Tomorrow do drops four times daily till told to stop. Please remember to bring all your drops/supplies with you to your post-op appointment.  You will be instructed on the drops at that time.    If you currently take pills to treat glaucoma, continue as normal.    You may resume your regular home medications, including any drops you are using in your NON-operative eye.      General Rules:    Avoid strenuous activity. Do not do anything that would cause you to strain or make your face red. Open your mouth if you cough, use a laxative if necessary, do not lift greater than 5 pounds.    Keep your head above your heart.  Do not bend down lower than waist level.  Bend with your knees.    Keep the bandage over your eye.  The doctor will remove the bandage at your appointment at the clinic.    Try to write down any questions you  "may have to bring to your post-op appointment.    Be restful today.    Pending Results     No orders found from 2017 to 2017.            Admission Information     Date & Time Provider Department Dept. Phone    2017 Marlee Carroll MD [facility] [phone]      Your Vitals Were     Blood Pressure Temperature Respirations Height Weight Pulse Oximetry    127/83 96.9  F (36.1  C) (Temporal) 16 1.581 m (5' 2.25\") 83.9 kg (185 lb) 96%    BMI (Body Mass Index)                   33.57 kg/m2           MyChart Information     [facility] lets you send messages to your doctor, view your test results, renew your prescriptions, schedule appointments and more. To sign up, go to [email or web address] . Click on \"Log in\" on the left side of the screen, which will take you to the Welcome page. Then click on \"Sign up Now\" on the right side of the page.     You will be asked to enter the access code listed below, as well as some personal information. Please follow the directions to create your username and password.     Your access code is: [email or web address]  Expires: 10/4/2017 11:25 AM     Your access code will  in 90 days. If you need help or a new code, please call your [facility] clinic or [phone].        Care EveryWhere ID     This is your Care EveryWhere ID. This could be used by other organizations to access your [facility] medical records  [id number]        Equal Access to Services     NANCY OSORIO : Hadii montse fordo Soannie, waaxda luqadaha, qaybta kaalmamaria guadalupe chowdary, jed ricketts . So [facility] [phone].    ATENCIÓN: Si habla español, tiene a castillo disposición servicios gratuitos de asistencia lingüística. Llame al [phone].    We comply with applicable federal civil rights laws and [place] laws. We do not discriminate on the basis of race, color, national origin, age, disability sex, sexual orientation or gender identity.               Review of " your medicines      UNREVIEWED medicines. Ask your doctor about these medicines        Dose / Directions    * albuterol 108 (90 BASE) MCG/ACT Inhaler   Commonly known as:  PROAIR HFA/PROVENTIL HFA/VENTOLIN HFA        Dose:  2 puff   Inhale 2 puffs into the lungs every 4 hours as needed for shortness of breath / dyspnea or wheezing   Refills:  0       * albuterol 1.25 MG/3ML nebulizer solution   Commonly known as:  ACCUNEB        Dose:  1 vial   Take 1 vial by nebulization every 6 hours as needed for shortness of breath / dyspnea or wheezing   Refills:  0       mometasone 220 MCG/INH Inhaler   Commonly known as:  ASMANEX        Inhale into the lungs every evening   Refills:  0       ofloxacin 0.3 % ophthalmic solution   Commonly known as:  OCUFLOX   Used for:  Age-related nuclear cataract of right eye        Dose:  1 drop   Place 1 drop into the right eye 4 times daily   Quantity:  1 Bottle   Refills:  0       SINGULAIR PO        Dose:  10 mg   Take 10 mg by mouth At Bedtime   Refills:  0       ZOCOR PO        Dose:  20 mg   Take 20 mg by mouth At Bedtime   Refills:  0       * Notice:  This list has 2 medication(s) that are the same as other medications prescribed for you. Read the directions carefully, and ask your doctor or other care provider to review them with you.      START taking        Dose / Directions    moxifloxacin 0.5 % ophthalmic solution   Commonly known as:  VIGAMOX   Used for:  Age-related nuclear cataract of left eye, Plateau iris syndrome (post-iridectomy) (postprocedural)        Dose:  1 drop   Place 1 drop Into the left eye 4 times daily for 7 days   Quantity:  1.5 mL   Refills:  0       prednisoLONE acetate 1 % ophthalmic susp   Commonly known as:  PRED FORTE   Used for:  Age-related nuclear cataract of left eye, Plateau iris syndrome (post-iridectomy) (postprocedural)        Dose:  1 drop   Place 1 drop Into the left eye 4 times daily   Quantity:  6 mL   Refills:  0            Where to get  your medicines      These medications were sent to [facility] [place], [place] - [address]  [address] [place] [address]     Phone:  [phone]     moxifloxacin 0.5 % ophthalmic solution    prednisoLONE acetate 1 % ophthalmic susp                Protect others around you: Learn how to safely use, store and throw away your medicines at www.disposemymeds.org.             Medication List: This is a list of all your medications and when to take them. Check marks below indicate your daily home schedule. Keep this list as a reference.      Medications           Morning Afternoon Evening Bedtime As Needed    * albuterol 108 (90 BASE) MCG/ACT Inhaler   Commonly known as:  PROAIR HFA/PROVENTIL HFA/VENTOLIN HFA   Inhale 2 puffs into the lungs every 4 hours as needed for shortness of breath / dyspnea or wheezing                                * albuterol 1.25 MG/3ML nebulizer solution   Commonly known as:  ACCUNEB   Take 1 vial by nebulization every 6 hours as needed for shortness of breath / dyspnea or wheezing                                mometasone 220 MCG/INH Inhaler   Commonly known as:  ASMANEX   Inhale into the lungs every evening                                moxifloxacin 0.5 % ophthalmic solution   Commonly known as:  VIGAMOX   Place 1 drop Into the left eye 4 times daily for 7 days                                ofloxacin 0.3 % ophthalmic solution   Commonly known as:  OCUFLOX   Place 1 drop into the right eye 4 times daily                                prednisoLONE acetate 1 % ophthalmic susp   Commonly known as:  PRED FORTE   Place 1 drop Into the left eye 4 times daily                                SINGULAIR PO   Take 10 mg by mouth At Bedtime                                ZOCOR PO   Take 20 mg by mouth At Bedtime                                * Notice:  This list has 2 medication(s) that are the same as other medications prescribed for you. Read the directions carefully,  and ask your doctor or other care provider to review them with you.

## 2017-12-18 ENCOUNTER — HOSPITAL ENCOUNTER (OUTPATIENT)
Dept: MAMMOGRAPHY | Facility: CLINIC | Age: 62
Discharge: HOME | End: 2017-12-18
Attending: FAMILY MEDICINE

## 2017-12-18 DIAGNOSIS — Z12.31 VISIT FOR SCREENING MAMMOGRAM: ICD-10-CM

## 2018-08-30 ENCOUNTER — RECORDS - HEALTHEAST (OUTPATIENT)
Dept: LAB | Facility: CLINIC | Age: 63
End: 2018-08-30

## 2018-08-30 LAB
CHOLEST SERPL-MCNC: 238 MG/DL
FASTING STATUS PATIENT QL REPORTED: ABNORMAL
FASTING STATUS PATIENT QL REPORTED: NORMAL
GLUCOSE BLD-MCNC: 100 MG/DL (ref 70–125)
HDLC SERPL-MCNC: 56 MG/DL
LDLC SERPL CALC-MCNC: 173 MG/DL
TRIGL SERPL-MCNC: 44 MG/DL

## 2018-08-31 LAB
HPV SOURCE: NORMAL
HUMAN PAPILLOMA VIRUS 16 DNA: NEGATIVE
HUMAN PAPILLOMA VIRUS 18 DNA: NEGATIVE
HUMAN PAPILLOMA VIRUS FINAL DIAGNOSIS: NORMAL
HUMAN PAPILLOMA VIRUS OTHER HR: NEGATIVE
SPECIMEN DESCRIPTION: NORMAL

## 2018-09-10 LAB

## 2018-10-01 ENCOUNTER — RECORDS - HEALTHEAST (OUTPATIENT)
Dept: LAB | Facility: CLINIC | Age: 63
End: 2018-10-01

## 2018-10-01 LAB
CHOLEST SERPL-MCNC: 180 MG/DL
FASTING STATUS PATIENT QL REPORTED: YES
HDLC SERPL-MCNC: 50 MG/DL
LDLC SERPL CALC-MCNC: 120 MG/DL
TRIGL SERPL-MCNC: 48 MG/DL

## 2019-02-13 ENCOUNTER — HOSPITAL ENCOUNTER (OUTPATIENT)
Dept: MAMMOGRAPHY | Facility: CLINIC | Age: 64
Discharge: HOME | End: 2019-02-13
Attending: FAMILY MEDICINE

## 2019-02-13 DIAGNOSIS — Z12.31 VISIT FOR SCREENING MAMMOGRAM: ICD-10-CM

## 2019-02-18 ENCOUNTER — RECORDS - HEALTHEAST (OUTPATIENT)
Dept: ADMINISTRATIVE | Facility: OTHER | Age: 64
End: 2019-02-18

## 2019-02-25 ENCOUNTER — HOSPITAL ENCOUNTER (OUTPATIENT)
Dept: MAMMOGRAPHY | Facility: CLINIC | Age: 64
Discharge: HOME | End: 2019-02-25
Attending: FAMILY MEDICINE

## 2019-02-25 DIAGNOSIS — N64.89 BREAST ASYMMETRY: ICD-10-CM

## 2019-12-09 ENCOUNTER — RECORDS - HEALTHEAST (OUTPATIENT)
Dept: LAB | Facility: CLINIC | Age: 64
End: 2019-12-09

## 2019-12-09 LAB
CHOLEST SERPL-MCNC: 224 MG/DL
FASTING STATUS PATIENT QL REPORTED: NO
HDLC SERPL-MCNC: 48 MG/DL
LDLC SERPL CALC-MCNC: 166 MG/DL
TRIGL SERPL-MCNC: 51 MG/DL

## 2019-12-11 LAB
LAB AP CHARGES (HE HISTORICAL CONVERSION): NORMAL
PATH REPORT.COMMENTS IMP SPEC: NORMAL
PATH REPORT.FINAL DX SPEC: NORMAL
PATH REPORT.GROSS SPEC: NORMAL
PATH REPORT.MICROSCOPIC SPEC OTHER STN: NORMAL
PATH REPORT.RELEVANT HX SPEC: NORMAL
RESULT FLAG (HE HISTORICAL CONVERSION): NORMAL

## 2020-03-11 ENCOUNTER — RECORDS - HEALTHEAST (OUTPATIENT)
Dept: ADMINISTRATIVE | Facility: OTHER | Age: 65
End: 2020-03-11

## 2020-03-11 ENCOUNTER — RECORDS - HEALTHEAST (OUTPATIENT)
Dept: LAB | Facility: CLINIC | Age: 65
End: 2020-03-11

## 2020-03-11 LAB
ALBUMIN SERPL-MCNC: 3.8 G/DL (ref 3.5–5)
ALT SERPL W P-5'-P-CCNC: 16 U/L (ref 0–45)
AST SERPL W P-5'-P-CCNC: 15 U/L (ref 0–40)
BASOPHILS # BLD AUTO: 0 THOU/UL (ref 0–0.2)
BASOPHILS NFR BLD AUTO: 1 % (ref 0–2)
C REACTIVE PROTEIN LHE: <0.1 MG/DL (ref 0–0.8)
CHOLEST SERPL-MCNC: 183 MG/DL
CREAT SERPL-MCNC: 0.79 MG/DL (ref 0.6–1.1)
EOSINOPHIL # BLD AUTO: 0.2 THOU/UL (ref 0–0.4)
EOSINOPHIL NFR BLD AUTO: 4 % (ref 0–6)
ERYTHROCYTE [DISTWIDTH] IN BLOOD BY AUTOMATED COUNT: 15.7 % (ref 11–14.5)
ERYTHROCYTE [SEDIMENTATION RATE] IN BLOOD BY WESTERGREN METHOD: 21 MM/HR (ref 0–20)
FASTING STATUS PATIENT QL REPORTED: NORMAL
GFR SERPL CREATININE-BSD FRML MDRD: >60 ML/MIN/1.73M2
HCT VFR BLD AUTO: 31.8 % (ref 35–47)
HDLC SERPL-MCNC: 56 MG/DL
HGB BLD-MCNC: 10.1 G/DL (ref 12–16)
LDLC SERPL CALC-MCNC: 117 MG/DL
LYMPHOCYTES # BLD AUTO: 2.2 THOU/UL (ref 0.8–4.4)
LYMPHOCYTES NFR BLD AUTO: 37 % (ref 20–40)
MCH RBC QN AUTO: 26.9 PG (ref 27–34)
MCHC RBC AUTO-ENTMCNC: 31.8 G/DL (ref 32–36)
MCV RBC AUTO: 85 FL (ref 80–100)
MONOCYTES # BLD AUTO: 0.4 THOU/UL (ref 0–0.9)
MONOCYTES NFR BLD AUTO: 6 % (ref 2–10)
NEUTROPHILS # BLD AUTO: 3 THOU/UL (ref 2–7.7)
NEUTROPHILS NFR BLD AUTO: 52 % (ref 50–70)
PLATELET # BLD AUTO: 291 THOU/UL (ref 140–440)
PMV BLD AUTO: 9.1 FL (ref 8.5–12.5)
RBC # BLD AUTO: 3.76 MILL/UL (ref 3.8–5.4)
RHEUMATOID FACT SERPL-ACNC: <15 IU/ML (ref 0–30)
TRIGL SERPL-MCNC: 51 MG/DL
WBC: 5.9 THOU/UL (ref 4–11)

## 2020-03-12 LAB — 25(OH)D3 SERPL-MCNC: 19.9 NG/ML (ref 30–80)

## 2020-03-13 LAB
GAMMA INTERFERON BACKGROUND BLD IA-ACNC: 0.07 IU/ML
M TB IFN-G BLD-IMP: NEGATIVE
MITOGEN IGNF BCKGRD COR BLD-ACNC: -0.01 IU/ML
MITOGEN IGNF BCKGRD COR BLD-ACNC: -0.03 IU/ML
QTF INTERPRETATION: NORMAL
QTF MITOGEN - NIL: 5.55 IU/ML

## 2020-09-03 ENCOUNTER — AMBULATORY - HEALTHEAST (OUTPATIENT)
Dept: SURGERY | Facility: AMBULATORY SURGERY CENTER | Age: 65
End: 2020-09-03

## 2020-09-03 DIAGNOSIS — Z11.59 ENCOUNTER FOR SCREENING FOR OTHER VIRAL DISEASES: ICD-10-CM

## 2020-09-28 ASSESSMENT — MIFFLIN-ST. JEOR: SCORE: 1314.72

## 2020-09-29 ENCOUNTER — ANESTHESIA - HEALTHEAST (OUTPATIENT)
Dept: SURGERY | Facility: AMBULATORY SURGERY CENTER | Age: 65
End: 2020-09-29

## 2020-09-30 ENCOUNTER — SURGERY - HEALTHEAST (OUTPATIENT)
Dept: SURGERY | Facility: AMBULATORY SURGERY CENTER | Age: 65
End: 2020-09-30

## 2020-09-30 ASSESSMENT — MIFFLIN-ST. JEOR: SCORE: 1314.72

## 2021-03-17 ENCOUNTER — RECORDS - HEALTHEAST (OUTPATIENT)
Dept: LAB | Facility: CLINIC | Age: 66
End: 2021-03-17

## 2021-03-17 LAB
ANION GAP SERPL CALCULATED.3IONS-SCNC: 10 MMOL/L (ref 5–18)
BUN SERPL-MCNC: 31 MG/DL (ref 8–22)
CALCIUM SERPL-MCNC: 8.7 MG/DL (ref 8.5–10.5)
CHLORIDE BLD-SCNC: 106 MMOL/L (ref 98–107)
CHOLEST SERPL-MCNC: 190 MG/DL
CO2 SERPL-SCNC: 25 MMOL/L (ref 22–31)
CREAT SERPL-MCNC: 0.79 MG/DL (ref 0.6–1.1)
FASTING STATUS PATIENT QL REPORTED: NO
GFR SERPL CREATININE-BSD FRML MDRD: >60 ML/MIN/1.73M2
GLUCOSE BLD-MCNC: 92 MG/DL (ref 70–125)
HDLC SERPL-MCNC: 53 MG/DL
LDLC SERPL CALC-MCNC: 127 MG/DL
POTASSIUM BLD-SCNC: 3.9 MMOL/L (ref 3.5–5)
SODIUM SERPL-SCNC: 141 MMOL/L (ref 136–145)
TRIGL SERPL-MCNC: 48 MG/DL

## 2021-05-10 ENCOUNTER — RECORDS - HEALTHEAST (OUTPATIENT)
Dept: MAMMOGRAPHY | Facility: CLINIC | Age: 66
End: 2021-05-10

## 2021-05-10 DIAGNOSIS — Z12.31 ENCOUNTER FOR SCREENING MAMMOGRAM FOR MALIGNANT NEOPLASM OF BREAST: ICD-10-CM

## 2021-05-13 ENCOUNTER — RECORDS - HEALTHEAST (OUTPATIENT)
Dept: ADMINISTRATIVE | Facility: OTHER | Age: 66
End: 2021-05-13

## 2021-05-19 ENCOUNTER — HOSPITAL ENCOUNTER (OUTPATIENT)
Dept: MAMMOGRAPHY | Facility: CLINIC | Age: 66
Discharge: HOME | End: 2021-05-19
Attending: FAMILY MEDICINE
Payer: COMMERCIAL

## 2021-05-19 DIAGNOSIS — N64.89 BREAST ASYMMETRY: ICD-10-CM

## 2021-05-28 ENCOUNTER — RECORDS - HEALTHEAST (OUTPATIENT)
Dept: ADMINISTRATIVE | Facility: CLINIC | Age: 66
End: 2021-05-28

## 2021-05-29 ENCOUNTER — RECORDS - HEALTHEAST (OUTPATIENT)
Dept: ADMINISTRATIVE | Facility: CLINIC | Age: 66
End: 2021-05-29

## 2021-05-30 ENCOUNTER — RECORDS - HEALTHEAST (OUTPATIENT)
Dept: ADMINISTRATIVE | Facility: CLINIC | Age: 66
End: 2021-05-30

## 2021-06-05 VITALS — HEIGHT: 62 IN | BODY MASS INDEX: 33.13 KG/M2 | WEIGHT: 180 LBS

## 2021-06-11 NOTE — ANESTHESIA POSTPROCEDURE EVALUATION
Patient: Guillermina Lopez  Procedure(s):  HYSTEROSCOPY, DILATION AND CURETTAGE  Anesthesia type: MAC    Patient location: Phase II Recovery  Last vitals:   Vitals Value Taken Time   /102 9/30/2020 10:05 AM   Temp 36.5  C (97.7  F) 9/30/2020  9:34 AM   Pulse 83 9/30/2020 10:07 AM   Resp 16 9/30/2020  9:34 AM   SpO2 100 % 9/30/2020 10:07 AM   Vitals shown include unvalidated device data.  Post vital signs: stable  Level of consciousness: awake and responds to simple questions  Post-anesthesia pain: pain controlled  Post-anesthesia nausea and vomiting: no  Pulmonary: unassisted, return to baseline  Cardiovascular: stable and blood pressure at baseline  Hydration: adequate  Anesthetic events: no    QCDR Measures:  ASA# 11 - Margarita-op Cardiac Arrest: ASA11B - Patient did NOT experience unanticipated cardiac arrest  ASA# 12 - Margarita-op Mortality Rate: ASA12B - Patient did NOT die  ASA# 13 - PACU Re-Intubation Rate: NA - No ETT / LMA used for case  ASA# 10 - Composite Anes Safety: ASA10A - No serious adverse event    Additional Notes:

## 2021-06-11 NOTE — ANESTHESIA PREPROCEDURE EVALUATION
Anesthesia Evaluation      Patient summary reviewed   No history of anesthetic complications     Airway   Mallampati: II  Neck ROM: full   Pulmonary - negative ROS and normal exam   (+) asthma  mild,  well controlled,   (-) not a smoker                         Cardiovascular - negative ROS and normal exam  Exercise tolerance: > or = 4 METS   Neuro/Psych - negative ROS     Endo/Other    (+) arthritis, obesity (bmi 33),      GI/Hepatic/Renal            Dental - normal exam                        Anesthesia Plan  Planned anesthetic: MAC  Versed/fentanyl/propofol  Ketamine PRN  Decadron/zofran  toradol if ok with surgeon    ASA 2   Induction: intravenous   Anesthetic plan and risks discussed with: patient  Anesthesia plan special considerations: antiemetics,   Post-op plan: routine recovery      Results for orders placed or performed in visit on 09/27/20   COVID-19 Virus PCR MRF    Specimen: Respiratory   Result Value Ref Range    COVID-19 VIRUS SPECIMEN SOURCE Nasopharyngeal     2019-nCOV Not Detected      Hgb 10.3

## 2021-06-11 NOTE — ANESTHESIA CARE TRANSFER NOTE
Last vitals:   Vitals:    09/30/20 0934   BP: (P) 164/86   Pulse: (!) (P) 109   Resp: (P) 16   Temp: (P) 36.5  C (97.7  F)   SpO2: (P) 100%     Patient's level of consciousness is drowsy  Spontaneous respirations: yes  Maintains airway independently: yes  Dentition unchanged: yes  Oropharynx: oropharynx clear of all foreign objects    QCDR Measures:  ASA# 20 - Surgical Safety Checklist: WHO surgical safety checklist completed prior to induction    PQRS# 430 - Adult PONV Prevention: 4558F - Pt received => 2 anti-emetic agents (different classes) preop & intraop  ASA# 8 - Peds PONV Prevention: NA - Not pediatric patient, not GA or 2 or more risk factors NOT present  PQRS# 424 - Margarita-op Temp Management: 4559F - At least one body temp DOCUMENTED => 35.5C or 95.9F within required timeframe  PQRS# 426 - PACU Transfer Protocol: - Transfer of care checklist used  ASA# 14 - Acute Post-op Pain: ASA14B - Patient did NOT experience pain >= 7 out of 10

## 2021-07-13 ENCOUNTER — RECORDS - HEALTHEAST (OUTPATIENT)
Dept: ADMINISTRATIVE | Facility: CLINIC | Age: 66
End: 2021-07-13

## 2021-07-21 ENCOUNTER — RECORDS - HEALTHEAST (OUTPATIENT)
Dept: ADMINISTRATIVE | Facility: CLINIC | Age: 66
End: 2021-07-21

## 2021-10-13 ENCOUNTER — MEDICAL CORRESPONDENCE (OUTPATIENT)
Dept: HEALTH INFORMATION MANAGEMENT | Facility: CLINIC | Age: 66
End: 2021-10-13

## 2021-10-13 ENCOUNTER — TRANSFERRED RECORDS (OUTPATIENT)
Dept: HEALTH INFORMATION MANAGEMENT | Facility: CLINIC | Age: 66
End: 2021-10-13

## 2022-01-03 ENCOUNTER — VIRTUAL VISIT (OUTPATIENT)
Dept: RHEUMATOLOGY | Facility: CLINIC | Age: 67
End: 2022-01-03
Payer: COMMERCIAL

## 2022-01-03 DIAGNOSIS — M05.731 RHEUMATOID ARTHRITIS INVOLVING RIGHT WRIST WITH POSITIVE RHEUMATOID FACTOR (H): ICD-10-CM

## 2022-01-03 DIAGNOSIS — M25.50 POLYARTHRALGIA: Primary | ICD-10-CM

## 2022-01-03 PROCEDURE — 99204 OFFICE O/P NEW MOD 45 MIN: CPT | Mod: 95 | Performed by: INTERNAL MEDICINE

## 2022-01-03 RX ORDER — FOLIC ACID 1 MG/1
TABLET ORAL
COMMUNITY
Start: 2020-05-27

## 2022-01-03 RX ORDER — IBUPROFEN 600 MG/1
TABLET, FILM COATED ORAL
COMMUNITY
Start: 2020-09-30

## 2022-01-03 NOTE — PROGRESS NOTES
Guillermina is a 66 year old who is being evaluated via a billable video visit.      How would you like to obtain your AVS? Mail a copy  If the video visit is dropped, the invitation should be resent by: Text to cell phone: [phone]  Will anyone else be joining your video visit? No      Video Start Time:  12:24pm  Video-Visit Details    Type of service:  Video Visit    Video End Time:12:44 PM    Originating Location (pt. Location): Home    Distant Location (provider location):  [facility]     Platform used for Video Visit: [employer]      This document was created using a software with less than 100% fidelity, at times resulting in unintended, even erroneous syntax and grammar.  The reader is advised to keep this under consideration while reviewing, interpreting this note.    ASSESSMENT AND PLAN:  Guillermina Lopez 66 year old female is seen here on 01/03/22 for evaluation of polyarthralgia, stiffness, and a question of if she has rheumatoid arthritis.  She used to follow-up elsewhere for RA, was on methotrexate, and seem to be doing well, was told to stop it and go for Orencia as she was not sure that was the right thing to do and is here for second opinion.  Meanwhile has tapered methotrexate to 0 by the end of December 2021.  Will request the previous data, the MRI report of her wrist from [facility], check labs as noted.  We will meet in person further course to be charted accordingly.  Diagnoses and all orders for this visit:  Polyarthralgia  -     Cyclic Citrullinated Peptide Antibody IgG; Future  -     Albumin level; Future  -     ALT; Future  -     CBC with Platelets & Differential; Future  -     Anti Nuclear Monika IgG by IFA with Reflex; Future  -     Creatinine; Future  -     CRP inflammation; Future  -     Erythrocyte sedimentation rate auto; Future  -     Hepatitis C antibody; Future  Rheumatoid arthritis involving right wrist with positive rheumatoid factor (H)  -     Rheumatology  "Referral        Follow-up in 8 weeks.  Labs in the next few days.    HISTORY OF PRESENTING ILLNESS:  Guillermina Lopez, 66 year old, female is here for evaluation of a question of if she has rheumatoid arthritis.  She reports that about 12 months or so ago she started experiencing pain.  This was to begin with in her right wrist.  She would have some swelling there.  The pain was moderately severe.  There is worse in the morning.  She took over-the-counter measures without help.  She was seen in orthopedics, x-rays were done, MRI was taken and was told that she may have rheumatoid arthritis was seen in [place] [facility] is, methotrexate was started she noted improvement.  As she recalls they suggested that she go on on Orencia.  As she was somewhat concerned that that may be to \"strong\" a drug.  Meanwhile she was asked to start tapering and stop methotrexate she used to take 8 tablets on every Wednesday she came down to 6 4 and 2 and has stopped it since the last visit of December 2021.  She has been achy throughout but the wrist pain has improved.  She reports no history of psoriasis herself or the family ulcerative colitis or Crohn's disease.  There is no family history of rheumatoid arthritis.  She does not smoke.  She works from home, she plans to retire in the near future.  She describes herself otherwise in good health apart from taking a statin for hypercholesterolemia she is not been on any medication prior to methotrexate folic acid.  She takes occasional Tylenol.  She would take this perhaps once a week or less often.  She has had her right knee arthroplasty.            Rest of the 13 system ROS is negative.     ALLERGIES:Patient has no known allergies.    PAST MEDICAL/ACTIVE PROBLEMS/MEDICATION/ FAMILY HISTORY/SOCIAL DATA:  The patient has a family history of  Past Medical History:   Diagnosis Date     Arthritis      Asthma      Hyperlipemia      Shortness of breath      Uncomplicated asthma  "     History   Smoking Status     Former Smoker     Quit date: 1/1/2000   Smokeless Tobacco     Never Used     There is no problem list on file for this patient.    Current Outpatient Medications   Medication Sig Dispense Refill     albuterol (ACCUNEB) 1.25 MG/3ML nebulizer solution Take 1 vial by nebulization every 6 hours as needed for shortness of breath / dyspnea or wheezing       albuterol (PROAIR HFA/PROVENTIL HFA/VENTOLIN HFA) 108 (90 BASE) MCG/ACT Inhaler Inhale 2 puffs into the lungs every 4 hours as needed for shortness of breath / dyspnea or wheezing       folic acid (FOLVITE) 1 MG tablet folic acid 1 mg tablet       ibuprofen (ADVIL/MOTRIN) 600 MG tablet ibuprofen 600 mg tablet       mometasone (ASMANEX) 220 MCG/INH Inhaler Inhale into the lungs every evening       Montelukast Sodium (SINGULAIR PO) Take 10 mg by mouth At Bedtime       ofloxacin (OCUFLOX) 0.3 % ophthalmic solution Place 1 drop into the right eye 4 times daily 1 Bottle 0     Simvastatin (ZOCOR PO) Take 20 mg by mouth At Bedtime         COMPREHENSIVE EXAMINATION:  There were no vitals filed for this visit.  A well appearing alert oriented female. Well appearing alert oriented.   Examination of the eyes revealed no redness, obvious scleromalacia.  ENT examination shows no nasal deformity such as of saddle type, external ear without signs of inflamm/deformity ation.  Cardiopulmonary examination without obvious signs of dyspnea, no wheezing is audible, no cyanosis.  There is no finger clubbing.  Skin examination performed for heliotrope, malar area eruption periungual erythema, lupus pernio.  Neurological examination shows the speech is fluent, no facial asymmetry, muscle power in the upper extremities proximally appears to be normal.  In the psychiatric examination the memory, orientation, attention, affect were observable and normal.  Joint examination of the DIPs, PIPs, MCPs, IP joints of the thumbs, wrists, elbows, for swelling, range of  motion, for shoulders range of motion evaluated.  She has no swelling of the digits, wrist range of motion is full without visible swelling on the video examination elbow shoulder range of motion is full.  There is no facial eruption.    LAB / IMAGING DATA:  ALT   Date Value Ref Range Status   03/11/2020 16 0 - 45 U/L Final     Albumin   Date Value Ref Range Status   03/11/2020 3.8 3.5 - 5.0 g/dL Final       WBC   Date Value Ref Range Status   03/11/2020 5.9 4.0 - 11.0 thou/uL Final     Hemoglobin   Date Value Ref Range Status   03/11/2020 10.1 (L) 12.0 - 16.0 g/dL Final     Platelet Count   Date Value Ref Range Status   03/11/2020 291 140 - 440 thou/uL Final       No results found for: LAWRENCE

## 2022-01-25 ENCOUNTER — LAB (OUTPATIENT)
Dept: LAB | Facility: CLINIC | Age: 67
End: 2022-01-25
Payer: COMMERCIAL

## 2022-01-25 DIAGNOSIS — M25.50 POLYARTHRALGIA: ICD-10-CM

## 2022-01-25 LAB
ALBUMIN SERPL-MCNC: 4.1 G/DL (ref 3.5–5)
ALT SERPL W P-5'-P-CCNC: 17 U/L (ref 0–45)
BASOPHILS # BLD AUTO: 0 10E3/UL (ref 0–0.2)
BASOPHILS NFR BLD AUTO: 0 %
C REACTIVE PROTEIN LHE: 0.1 MG/DL (ref 0–0.8)
CCP AB SER IA-ACNC: <0.5 U/ML
CREAT SERPL-MCNC: 0.91 MG/DL (ref 0.6–1.1)
EOSINOPHIL # BLD AUTO: 0.2 10E3/UL (ref 0–0.7)
EOSINOPHIL NFR BLD AUTO: 4 %
ERYTHROCYTE [DISTWIDTH] IN BLOOD BY AUTOMATED COUNT: 14.6 % (ref 10–15)
ERYTHROCYTE [SEDIMENTATION RATE] IN BLOOD BY WESTERGREN METHOD: 16 MM/HR (ref 0–20)
GFR SERPL CREATININE-BSD FRML MDRD: 70 ML/MIN/1.73M2
HCT VFR BLD AUTO: 37.1 % (ref 35–47)
HGB BLD-MCNC: 12 G/DL (ref 11.7–15.7)
IMM GRANULOCYTES # BLD: 0 10E3/UL
IMM GRANULOCYTES NFR BLD: 0 %
LYMPHOCYTES # BLD AUTO: 1.9 10E3/UL (ref 0.8–5.3)
LYMPHOCYTES NFR BLD AUTO: 35 %
MCH RBC QN AUTO: 29.3 PG (ref 26.5–33)
MCHC RBC AUTO-ENTMCNC: 32.3 G/DL (ref 31.5–36.5)
MCV RBC AUTO: 91 FL (ref 78–100)
MONOCYTES # BLD AUTO: 0.4 10E3/UL (ref 0–1.3)
MONOCYTES NFR BLD AUTO: 7 %
NEUTROPHILS # BLD AUTO: 3 10E3/UL (ref 1.6–8.3)
NEUTROPHILS NFR BLD AUTO: 54 %
PLATELET # BLD AUTO: 264 10E3/UL (ref 150–450)
RBC # BLD AUTO: 4.09 10E6/UL (ref 3.8–5.2)
WBC # BLD AUTO: 5.5 10E3/UL (ref 4–11)

## 2022-01-25 PROCEDURE — 86038 ANTINUCLEAR ANTIBODIES: CPT

## 2022-01-25 PROCEDURE — 82565 ASSAY OF CREATININE: CPT

## 2022-01-25 PROCEDURE — 82040 ASSAY OF SERUM ALBUMIN: CPT

## 2022-01-25 PROCEDURE — 85652 RBC SED RATE AUTOMATED: CPT

## 2022-01-25 PROCEDURE — 86803 HEPATITIS C AB TEST: CPT

## 2022-01-25 PROCEDURE — 85025 COMPLETE CBC W/AUTO DIFF WBC: CPT

## 2022-01-25 PROCEDURE — 84460 ALANINE AMINO (ALT) (SGPT): CPT

## 2022-01-25 PROCEDURE — 86039 ANTINUCLEAR ANTIBODIES (ANA): CPT

## 2022-01-25 PROCEDURE — 86140 C-REACTIVE PROTEIN: CPT

## 2022-01-25 PROCEDURE — 36415 COLL VENOUS BLD VENIPUNCTURE: CPT

## 2022-01-25 PROCEDURE — 86200 CCP ANTIBODY: CPT

## 2022-01-26 LAB
ANA PAT SER IF-IMP: ABNORMAL
ANA SER QL IF: POSITIVE
ANA TITR SER IF: ABNORMAL {TITER}
HCV AB SERPL QL IA: NEGATIVE

## 2022-02-03 ENCOUNTER — LAB (OUTPATIENT)
Dept: LAB | Facility: CLINIC | Age: 67
End: 2022-02-03

## 2022-02-03 ENCOUNTER — OFFICE VISIT (OUTPATIENT)
Dept: RHEUMATOLOGY | Facility: CLINIC | Age: 67
End: 2022-02-03
Payer: COMMERCIAL

## 2022-02-03 VITALS
SYSTOLIC BLOOD PRESSURE: 150 MMHG | DIASTOLIC BLOOD PRESSURE: 80 MMHG | BODY MASS INDEX: 35.5 KG/M2 | HEART RATE: 74 BPM | WEIGHT: 194.1 LBS

## 2022-02-03 DIAGNOSIS — R76.8 ANA POSITIVE: ICD-10-CM

## 2022-02-03 DIAGNOSIS — M70.61 TROCHANTERIC BURSITIS OF BOTH HIPS: ICD-10-CM

## 2022-02-03 DIAGNOSIS — M70.62 TROCHANTERIC BURSITIS OF BOTH HIPS: ICD-10-CM

## 2022-02-03 DIAGNOSIS — M15.0 PRIMARY OSTEOARTHRITIS INVOLVING MULTIPLE JOINTS: Primary | ICD-10-CM

## 2022-02-03 LAB
C3 SERPL-MCNC: 146 MG/DL (ref 83–177)
C4 SERPL-MCNC: 32 MG/DL (ref 19–59)

## 2022-02-03 PROCEDURE — 99214 OFFICE O/P EST MOD 30 MIN: CPT | Performed by: INTERNAL MEDICINE

## 2022-02-03 PROCEDURE — 86225 DNA ANTIBODY NATIVE: CPT

## 2022-02-03 PROCEDURE — 86235 NUCLEAR ANTIGEN ANTIBODY: CPT

## 2022-02-03 PROCEDURE — 36415 COLL VENOUS BLD VENIPUNCTURE: CPT

## 2022-02-03 PROCEDURE — 86160 COMPLEMENT ANTIGEN: CPT

## 2022-02-03 NOTE — PROGRESS NOTES
"      Rheumatology follow-up visit note     Guillermina is a 66 year old female presents today for follow-up.    Guillermina was seen today for recheck.    Diagnoses and all orders for this visit:    Primary osteoarthritis involving multiple joints    Trochanteric bursitis of both hips    LAWRENCE positive  -     DNA double stranded antibodies; Future  -     MURIEL antibody panel; Future  -     Complement C4; Future  -     Complement C3; Future        This patient's arthralgias predominantly in the right hip and right knee are consistent with trochanteric bursitis, osteoarthritis of the knees, negative rheumatoid factor and negative anti-CCP antibody, fortunately on today's evaluation there is little to suggest active connective tissue disease such as SLE or rheumatoid arthritis.  Further work-up and interrogation of the LAWRENCE as noted.  Today we discussed the management of osteoarthritis and bursitis.    Follow up in 3 months.    HPI    Guillermina Lopez is a 66 year old female is here for follow-up.  She is complaining of pain in her right knee, right hip.  This is especially bothersome first thing in the morning when she wakes up as she stands and begins to walk these areas hurt the most.  Takes about 5 minutes for her to get limbered up.  At nighttime she is sometimes woken up because of the right hip pain.  This is in the background of diagnosis of rheumatoid arthritis for which she was on methotrexate and had been recommended to take Orencia as narrated below.  She recalls today that the MRI done at the orthopedics at that initially prior to her referral to the outside rheumatology practice was thought to be \"normal\" we do not have access to that report will request that today.  Her recent labs show negative rheumatoid factor anti-CCP antibody.  LAWRENCE positive during the recent labs to be interrogated further today..        Following is the excerpt from a previous note:  She reports that about 12 months or so ago she started " "experiencing pain.  This was to begin with in her right wrist.  She would have some swelling there.  The pain was moderately severe.  There is worse in the morning.  She took over-the-counter measures without help.  She was seen in orthopedics, x-rays were done, MRI was taken and was told that she may have rheumatoid arthritis was seen in [place] [facility] is, methotrexate was started she noted improvement.  As she recalls they suggested that she go on on Orencia.  As she was somewhat concerned that that may be to \"strong\" a drug.  Meanwhile she was asked to start tapering and stop methotrexate she used to take 8 tablets on every Wednesday she came down to 6 4 and 2 and has stopped it since the last visit of December 2021.  She has been achy throughout but the wrist pain has improved.  She reports no history of psoriasis herself or the family ulcerative colitis or Crohn's disease.  There is no family history of rheumatoid arthritis.  She does not smoke.  She works from home, she plans to retire in the near future.  She describes herself otherwise in good health apart from taking a statin for hypercholesterolemia she is not been on any medication prior to methotrexate folic acid.  She takes occasional Tylenol.  She would take this perhaps once a week or less often.  She has had her right knee arthroplasty.       DETAILED EXAMINATION  02/03/22  :    Vitals:    02/03/22 0839   BP: (!) 150/80   Pulse: 74   Weight: 88 kg (194 lb 1.6 oz)     Alert oriented. Head including the face is examined for malar rash, heliotropes, scarring, lupus pernio. Eyes examined for redness such as in episcleritis/scleritis, periorbital lesions.   Neck/ Face examined for parotid gland swelling, range of motion of neck.  Left upper and lower and right upper and lower extremities examined for tenderness, swelling, warmth of the appendicular joints, range of motion, edema, rash.  Some of the important findings included: she does not have " evidence of synovitis in the palpable joints of the upper extremities.  No significant deformities of the digits.  She is tender in the right trochanteric area more so than the left.  She has got on her right knee from the hemiarthroplasty.  She does not have dactylitis of the digits.  She does not have tenderness across the lumbosacral area.  She does not have a rash on her face, stomatitis, sclerodactyly, periungual erythema.  There are no problems to display for this patient.    Past Surgical History:   Procedure Laterality Date     ARTHROPLASTY KNEE Right      ARTHROSCOPY KNEE       COLONOSCOPY       COLONOSCOPY       GYN SURGERY      TUBAL LIGATION     ORTHOPEDIC SURGERY      bunion     ORTHOPEDIC SURGERY      carpal tunnel     ORTHOPEDIC SURGERY      knee arthroplasty     PHACOEMULSIFICATION CLEAR CORNEA W/ STANDARD IOL IMPLANT, ENDOSCOPIC CYCLOPHOTOCOAGULATION, COMBINED Right 7/6/2017    Procedure: COMBINED PHACOEMULSIFICATION CLEAR CORNEA WITH STANDARD INTRAOCULAR LENS IMPLANT, ENDOSCOPIC CYCLOPHOTOCOAGULATION;  RIGHT EYE PHACOEMULSIFICATION CLEAR CORNEA WITH STANDARD INTRAOCULAR LENS IMPLANT; ENDOSCOPIC CYCLOPHOTOCOAGULATION ;  Surgeon: Marlee Carroll MD;  Location: [facility]     PHACOEMULSIFICATION CLEAR CORNEA W/ STANDARD IOL IMPLANT, ENDOSCOPIC CYCLOPHOTOCOAGULATION, COMBINED Left 9/7/2017    Procedure: COMBINED PHACOEMULSIFICATION CLEAR CORNEA WITH STANDARD INTRAOCULAR LENS IMPLANT, ENDOSCOPIC CYCLOPHOTOCOAGULATION;  LEFT EYE COMBINED PHACOEMULSIFICATION CLEAR CORNEA WITH STANDARD INTRAOCULAR LENS IMPLANT, ENDOSCOPIC CYCLOPHOTOCOAGULATION;  Surgeon: Marlee Carroll MD;  Location: [facility]     TN HYSTEROSCOPY,W/ENDO BX N/A 9/30/2020    Procedure: HYSTEROSCOPY, DILATION AND CURETTAGE;  Surgeon: Baljinder Gonzalez MD;  Location: [facility];  Service: Gynecology     TUBAL LIGATION        Past Medical History:   Diagnosis Date     Arthritis      Asthma      Hyperlipemia      Shortness of  breath      Uncomplicated asthma      No Known Allergies  Current Outpatient Medications   Medication Sig Dispense Refill     albuterol (ACCUNEB) 1.25 MG/3ML nebulizer solution Take 1 vial by nebulization every 6 hours as needed for shortness of breath / dyspnea or wheezing       albuterol (PROAIR HFA/PROVENTIL HFA/VENTOLIN HFA) 108 (90 BASE) MCG/ACT Inhaler Inhale 2 puffs into the lungs every 4 hours as needed for shortness of breath / dyspnea or wheezing       folic acid (FOLVITE) 1 MG tablet folic acid 1 mg tablet       ibuprofen (ADVIL/MOTRIN) 600 MG tablet ibuprofen 600 mg tablet       mometasone (ASMANEX) 220 MCG/INH Inhaler Inhale into the lungs every evening       Montelukast Sodium (SINGULAIR PO) Take 10 mg by mouth At Bedtime       ofloxacin (OCUFLOX) 0.3 % ophthalmic solution Place 1 drop into the right eye 4 times daily 1 Bottle 0     Simvastatin (ZOCOR PO) Take 20 mg by mouth At Bedtime       family history includes Breast Cancer (age of onset: 48.00) in her sister; Cancer (age of onset: 53.00) in her sister.  Social Connections: Not on file          WBC Count   Date Value Ref Range Status   01/25/2022 5.5 4.0 - 11.0 10e3/uL Final     RBC Count   Date Value Ref Range Status   01/25/2022 4.09 3.80 - 5.20 10e6/uL Final     Hemoglobin   Date Value Ref Range Status   01/25/2022 12.0 11.7 - 15.7 g/dL Final     Hematocrit   Date Value Ref Range Status   01/25/2022 37.1 35.0 - 47.0 % Final     MCV   Date Value Ref Range Status   01/25/2022 91 78 - 100 fL Final     MCH   Date Value Ref Range Status   01/25/2022 29.3 26.5 - 33.0 pg Final     Platelet Count   Date Value Ref Range Status   01/25/2022 264 150 - 450 10e3/uL Final     % Lymphocytes   Date Value Ref Range Status   01/25/2022 35 % Final     AST   Date Value Ref Range Status   03/11/2020 15 0 - 40 U/L Final     ALT   Date Value Ref Range Status   01/25/2022 17 0 - 45 U/L Final     Albumin   Date Value Ref Range Status   01/25/2022 4.1 3.5 - 5.0 g/dL  Final     Creatinine   Date Value Ref Range Status   01/25/2022 0.91 0.60 - 1.10 mg/dL Final     GFR Estimate   Date Value Ref Range Status   01/25/2022 70 >60 mL/min/1.73m2 Final     Comment:     Effective December 21, 2021 eGFRcr in adults is calculated using the 2021 CKD-EPI creatinine equation which includes age and gender (Romel colón al., [place], DOI: 10.1056/TLGSgg2438287)   03/17/2021 >60 >60 mL/min/1.73m2 Final     GFR Estimate If Black   Date Value Ref Range Status   03/17/2021 >60 >60 mL/min/1.73m2 Final     Erythrocyte Sedimentation Rate   Date Value Ref Range Status   01/25/2022 16 0 - 20 mm/hr Final     CRP   Date Value Ref Range Status   01/25/2022 0.1 0.0-<0.8 mg/dL Final

## 2022-02-04 LAB
DSDNA AB SER-ACNC: 1.3 IU/ML
ENA SM IGG SER IA-ACNC: <1.6 U/ML
ENA SM IGG SER IA-ACNC: NEGATIVE
ENA SS-A AB SER IA-ACNC: 0.7 U/ML
ENA SS-A AB SER IA-ACNC: NEGATIVE
ENA SS-B IGG SER IA-ACNC: <0.6 U/ML
ENA SS-B IGG SER IA-ACNC: NEGATIVE
U1 SNRNP IGG SER IA-ACNC: <1.1 U/ML
U1 SNRNP IGG SER IA-ACNC: NEGATIVE

## 2022-03-09 ENCOUNTER — OFFICE VISIT (OUTPATIENT)
Dept: RHEUMATOLOGY | Facility: CLINIC | Age: 67
End: 2022-03-09
Payer: COMMERCIAL

## 2022-03-09 VITALS
WEIGHT: 189.2 LBS | DIASTOLIC BLOOD PRESSURE: 94 MMHG | HEIGHT: 62 IN | BODY MASS INDEX: 34.82 KG/M2 | HEART RATE: 80 BPM | SYSTOLIC BLOOD PRESSURE: 140 MMHG

## 2022-03-09 DIAGNOSIS — R76.8 ANA POSITIVE: ICD-10-CM

## 2022-03-09 DIAGNOSIS — M25.50 POLYARTHRALGIA: ICD-10-CM

## 2022-03-09 DIAGNOSIS — M15.0 PRIMARY OSTEOARTHRITIS INVOLVING MULTIPLE JOINTS: Primary | ICD-10-CM

## 2022-03-09 PROCEDURE — 99214 OFFICE O/P EST MOD 30 MIN: CPT | Performed by: INTERNAL MEDICINE

## 2022-03-09 RX ORDER — DULOXETIN HYDROCHLORIDE 30 MG/1
30 CAPSULE, DELAYED RELEASE ORAL DAILY
Qty: 30 CAPSULE | Refills: 2 | Status: SHIPPED | OUTPATIENT
Start: 2022-03-09 | End: 2022-05-09

## 2022-03-09 NOTE — PROGRESS NOTES
Rheumatology follow-up visit note     Guillermina is a 67 year old female presents today for follow-up.    Guillermina was seen today for recheck.    Diagnoses and all orders for this visit:    Primary osteoarthritis involving multiple joints  -     DULoxetine (CYMBALTA) 30 MG capsule; Take 1 capsule (30 mg) by mouth daily    LAWRENCE positive    Polyarthralgia  -     DULoxetine (CYMBALTA) 30 MG capsule; Take 1 capsule (30 mg) by mouth daily        This patient with osteoarthritis predominantly of the knees, positive LAWRENCE without evidence of a connective tissue disease, without evidence of rheumatoid arthritis is going to try duloxetine.  Major side effect literature provided.  She wondered about the diagnosis of RA that was made in the past.  I have outlined to her that on the basis of today's and her recent visit 's evaluation I am unable to validate that diagnosis at the current time.  I am unable to comment on the diagnosis which was made elsewhere couple of years ago.    Follow up in 3 months.    HPI    Guillermina Lopez is a 67 year old female is here for follow-up. She is complaining of pain in her bilateral knee worse on the right, right hip.  This is especially bothersome first thing in the morning when she wakes up as she stands and begins to walk these areas hurt the most.  Her recent LAWRENCE was further looked into her complements MURIEL dsDNA negative.  She has little in the way of other parameters of an active connective tissue disease such as rash, stomatitis, pleuritis, history of DVT PE that she is aware of.  Takes about 5 minutes for her to get limbered up.  At nighttime she is sometimes woken up because of the right hip pain.  This is in the background of diagnosis of rheumatoid arthritis for which she was on methotrexate and had been recommended to take Orencia as narrated below.  She recalls today that the MRI done at the orthopedics at that initially prior to her referral to the outside rheumatology practice was  "thought to be \"normal\" we do not have access to that report will request that today.  Her recent labs show negative rheumatoid factor anti-CCP antibody.        Following is the excerpt from a previous note:  She reports that about 12 months or so ago she started experiencing pain.  This was to begin with in her right wrist.  She would have some swelling there.  The pain was moderately severe.  There is worse in the morning.  She took over-the-counter measures without help.  She was seen in orthopedics, x-rays were done, MRI was taken and was told that she may have rheumatoid arthritis was seen in [facility] is, methotrexate was started she noted improvement.  As she recalls they suggested that she go on on Orencia.  As she was somewhat concerned that that may be to \"strong\" a drug.  Meanwhile she was asked to start tapering and stop methotrexate she used to take 8 tablets on every Wednesday she came down to 6 4 and 2 and has stopped it since the last visit of December 2021.  She has been achy throughout but the wrist pain has improved.  She reports no history of psoriasis herself or the family ulcerative colitis or Crohn's disease.  There is no family history of rheumatoid arthritis.  She does not smoke.  She works from home, she plans to retire in the near future.  She describes herself otherwise in good health apart from taking a statin for hypercholesterolemia she is not been on any medication prior to methotrexate folic acid.  She takes occasional Tylenol.  She would take this perhaps once a week or less often.  She has had her right knee hemiarthroplasty.           DETAILED EXAMINATION  03/09/22  :    Vitals:    03/09/22 0920   BP: (!) 140/94   BP Location: Right arm   Patient Position: Standing   Cuff Size: Adult Regular   Pulse: 80   Weight: 85.8 kg (189 lb 3.2 oz)   Height: 1.575 m (5' 2\")     Alert oriented. Head including the face is examined for malar rash, heliotropes, scarring, lupus pernio. " Eyes examined for redness such as in episcleritis/scleritis, periorbital lesions.   Neck/ Face examined for parotid gland swelling, range of motion of neck.  Left upper and lower and right upper and lower extremities examined for tenderness, swelling, warmth of the appendicular joints, range of motion, edema, rash.  Some of the important findings included: she does not have evidence of synovitis in the palpable joints of the upper extremities.  No significant deformities of the digits.  no Heberden nodes.  Range of motion of the shoulders  show full abduction.  No JLT effusion or warmth of the knees, scar right knee .  she does not have dactylitis of the digits.     There are no problems to display for this patient.    Past Surgical History:   Procedure Laterality Date     ARTHROPLASTY KNEE Right      ARTHROSCOPY KNEE       COLONOSCOPY       COLONOSCOPY       GYN SURGERY      TUBAL LIGATION     ORTHOPEDIC SURGERY      bunion     ORTHOPEDIC SURGERY      carpal tunnel     ORTHOPEDIC SURGERY      knee arthroplasty     PHACOEMULSIFICATION CLEAR CORNEA W/ STANDARD IOL IMPLANT, ENDOSCOPIC CYCLOPHOTOCOAGULATION, COMBINED Right 7/6/2017    Procedure: COMBINED PHACOEMULSIFICATION CLEAR CORNEA WITH STANDARD INTRAOCULAR LENS IMPLANT, ENDOSCOPIC CYCLOPHOTOCOAGULATION;  RIGHT EYE PHACOEMULSIFICATION CLEAR CORNEA WITH STANDARD INTRAOCULAR LENS IMPLANT; ENDOSCOPIC CYCLOPHOTOCOAGULATION ;  Surgeon: Marlee Carroll MD;  Location: [facility]     PHACOEMULSIFICATION CLEAR CORNEA W/ STANDARD IOL IMPLANT, ENDOSCOPIC CYCLOPHOTOCOAGULATION, COMBINED Left 9/7/2017    Procedure: COMBINED PHACOEMULSIFICATION CLEAR CORNEA WITH STANDARD INTRAOCULAR LENS IMPLANT, ENDOSCOPIC CYCLOPHOTOCOAGULATION;  LEFT EYE COMBINED PHACOEMULSIFICATION CLEAR CORNEA WITH STANDARD INTRAOCULAR LENS IMPLANT, ENDOSCOPIC CYCLOPHOTOCOAGULATION;  Surgeon: Marlee Carroll MD;  Location: [facility]     [place] HYSTEROSCOPY,W/ENDO BX N/A 9/30/2020    Procedure:  HYSTEROSCOPY, DILATION AND CURETTAGE;  Surgeon: Baljinder Gonzalez MD;  Location: [facility];  Service: Gynecology     TUBAL LIGATION        Past Medical History:   Diagnosis Date     Arthritis      Asthma      Hyperlipemia      Shortness of breath      Uncomplicated asthma      No Known Allergies  Current Outpatient Medications   Medication Sig Dispense Refill     albuterol (ACCUNEB) 1.25 MG/3ML nebulizer solution Take 1 vial by nebulization every 6 hours as needed for shortness of breath / dyspnea or wheezing       albuterol (PROAIR HFA/PROVENTIL HFA/VENTOLIN HFA) 108 (90 BASE) MCG/ACT Inhaler Inhale 2 puffs into the lungs every 4 hours as needed for shortness of breath / dyspnea or wheezing       folic acid (FOLVITE) 1 MG tablet folic acid 1 mg tablet       ibuprofen (ADVIL/MOTRIN) 600 MG tablet ibuprofen 600 mg tablet       mometasone (ASMANEX) 220 MCG/INH Inhaler Inhale into the lungs every evening       Montelukast Sodium (SINGULAIR PO) Take 10 mg by mouth At Bedtime       ofloxacin (OCUFLOX) 0.3 % ophthalmic solution Place 1 drop into the right eye 4 times daily 1 Bottle 0     Simvastatin (ZOCOR PO) Take 20 mg by mouth At Bedtime       family history includes Breast Cancer (age of onset: 48.00) in her sister; Cancer (age of onset: 53.00) in her sister.  Social Connections: Not on file          WBC Count   Date Value Ref Range Status   01/25/2022 5.5 4.0 - 11.0 10e3/uL Final     RBC Count   Date Value Ref Range Status   01/25/2022 4.09 3.80 - 5.20 10e6/uL Final     Hemoglobin   Date Value Ref Range Status   01/25/2022 12.0 11.7 - 15.7 g/dL Final     Hematocrit   Date Value Ref Range Status   01/25/2022 37.1 35.0 - 47.0 % Final     MCV   Date Value Ref Range Status   01/25/2022 91 78 - 100 fL Final     MCH   Date Value Ref Range Status   01/25/2022 29.3 26.5 - 33.0 pg Final     Platelet Count   Date Value Ref Range Status   01/25/2022 264 150 - 450 10e3/uL Final     % Lymphocytes   Date Value Ref Range  Status   01/25/2022 35 % Final     AST   Date Value Ref Range Status   03/11/2020 15 0 - 40 U/L Final     ALT   Date Value Ref Range Status   01/25/2022 17 0 - 45 U/L Final     Albumin   Date Value Ref Range Status   01/25/2022 4.1 3.5 - 5.0 g/dL Final     Creatinine   Date Value Ref Range Status   01/25/2022 0.91 0.60 - 1.10 mg/dL Final     GFR Estimate   Date Value Ref Range Status   01/25/2022 70 >60 mL/min/1.73m2 Final     Comment:     Effective December 21, 2021 eGFRcr in adults is calculated using the 2021 CKD-EPI creatinine equation which includes age and gender (Romel et al., NEJM, DOI: 10.1056/ZJJBjk9119531)   03/17/2021 >60 >60 mL/min/1.73m2 Final     GFR Estimate If Black   Date Value Ref Range Status   03/17/2021 >60 >60 mL/min/1.73m2 Final     Erythrocyte Sedimentation Rate   Date Value Ref Range Status   01/25/2022 16 0 - 20 mm/hr Final     CRP   Date Value Ref Range Status   01/25/2022 0.1 0.0-<0.8 mg/dL Final

## 2022-03-24 ENCOUNTER — LAB REQUISITION (OUTPATIENT)
Dept: LAB | Facility: CLINIC | Age: 67
End: 2022-03-24

## 2022-03-24 DIAGNOSIS — E78.5 HYPERLIPIDEMIA, UNSPECIFIED: ICD-10-CM

## 2022-03-24 LAB
CHOLEST SERPL-MCNC: 192 MG/DL
FASTING STATUS PATIENT QL REPORTED: NORMAL
HDLC SERPL-MCNC: 58 MG/DL
LDLC SERPL CALC-MCNC: 127 MG/DL
TRIGL SERPL-MCNC: 37 MG/DL

## 2022-03-24 PROCEDURE — 80061 LIPID PANEL: CPT | Performed by: FAMILY MEDICINE

## 2022-05-07 DIAGNOSIS — M15.0 PRIMARY OSTEOARTHRITIS INVOLVING MULTIPLE JOINTS: ICD-10-CM

## 2022-05-07 DIAGNOSIS — M25.50 POLYARTHRALGIA: ICD-10-CM

## 2022-05-09 RX ORDER — DULOXETIN HYDROCHLORIDE 30 MG/1
CAPSULE, DELAYED RELEASE ORAL
Qty: 30 CAPSULE | Refills: 2 | Status: SHIPPED | OUTPATIENT
Start: 2022-05-09

## 2023-04-23 DIAGNOSIS — M15.0 PRIMARY OSTEOARTHRITIS INVOLVING MULTIPLE JOINTS: ICD-10-CM

## 2023-04-23 DIAGNOSIS — M25.50 POLYARTHRALGIA: ICD-10-CM

## 2023-04-24 RX ORDER — DULOXETIN HYDROCHLORIDE 30 MG/1
CAPSULE, DELAYED RELEASE ORAL
Qty: 30 CAPSULE | Refills: 2 | OUTPATIENT
Start: 2023-04-24

## 2023-05-03 ENCOUNTER — LAB REQUISITION (OUTPATIENT)
Dept: LAB | Facility: CLINIC | Age: 68
End: 2023-05-03
Payer: COMMERCIAL

## 2023-05-03 DIAGNOSIS — E78.5 HYPERLIPIDEMIA, UNSPECIFIED: ICD-10-CM

## 2023-05-03 LAB
ANION GAP SERPL CALCULATED.3IONS-SCNC: 11 MMOL/L (ref 7–15)
BUN SERPL-MCNC: 21.8 MG/DL (ref 8–23)
CALCIUM SERPL-MCNC: 9.1 MG/DL (ref 8.8–10.2)
CHLORIDE SERPL-SCNC: 106 MMOL/L (ref 98–107)
CHOLEST SERPL-MCNC: 192 MG/DL
CREAT SERPL-MCNC: 0.92 MG/DL (ref 0.51–0.95)
DEPRECATED HCO3 PLAS-SCNC: 25 MMOL/L (ref 22–29)
GFR SERPL CREATININE-BSD FRML MDRD: 68 ML/MIN/1.73M2
GLUCOSE SERPL-MCNC: 100 MG/DL (ref 70–99)
HDLC SERPL-MCNC: 56 MG/DL
LDLC SERPL CALC-MCNC: 126 MG/DL
NONHDLC SERPL-MCNC: 136 MG/DL
POTASSIUM SERPL-SCNC: 4 MMOL/L (ref 3.4–5.3)
SODIUM SERPL-SCNC: 142 MMOL/L (ref 136–145)
TRIGL SERPL-MCNC: 48 MG/DL

## 2023-05-03 PROCEDURE — 80061 LIPID PANEL: CPT | Mod: ORL | Performed by: FAMILY MEDICINE

## 2023-05-03 PROCEDURE — 80048 BASIC METABOLIC PNL TOTAL CA: CPT | Mod: ORL | Performed by: FAMILY MEDICINE

## 2023-11-10 ENCOUNTER — ANCILLARY PROCEDURE (OUTPATIENT)
Dept: MAMMOGRAPHY | Facility: HOSPITAL | Age: 68
End: 2023-11-10
Payer: COMMERCIAL

## 2023-11-10 DIAGNOSIS — Z12.31 VISIT FOR SCREENING MAMMOGRAM: ICD-10-CM

## 2023-11-10 PROCEDURE — 77067 SCR MAMMO BI INCL CAD: CPT

## 2023-12-07 ENCOUNTER — LAB REQUISITION (OUTPATIENT)
Dept: LAB | Facility: CLINIC | Age: 68
End: 2023-12-07
Payer: COMMERCIAL

## 2023-12-07 DIAGNOSIS — D64.9 ANEMIA, UNSPECIFIED: ICD-10-CM

## 2023-12-07 LAB
FERRITIN SERPL-MCNC: 17 NG/ML (ref 11–328)
IRON BINDING CAPACITY (ROCHE): 412 UG/DL (ref 240–430)
IRON SATN MFR SERPL: 18 % (ref 15–46)
IRON SERPL-MCNC: 76 UG/DL (ref 37–145)
VIT B12 SERPL-MCNC: 849 PG/ML (ref 232–1245)

## 2023-12-07 PROCEDURE — 82728 ASSAY OF FERRITIN: CPT | Mod: ORL | Performed by: PHYSICIAN ASSISTANT

## 2023-12-07 PROCEDURE — 82607 VITAMIN B-12: CPT | Mod: ORL | Performed by: PHYSICIAN ASSISTANT

## 2023-12-07 PROCEDURE — 83550 IRON BINDING TEST: CPT | Mod: ORL | Performed by: PHYSICIAN ASSISTANT

## 2024-03-18 ENCOUNTER — LAB REQUISITION (OUTPATIENT)
Dept: LAB | Facility: CLINIC | Age: 69
End: 2024-03-18
Payer: COMMERCIAL

## 2024-03-18 DIAGNOSIS — R63.5 ABNORMAL WEIGHT GAIN: ICD-10-CM

## 2024-03-18 LAB — TSH SERPL DL<=0.005 MIU/L-ACNC: 1.55 UIU/ML (ref 0.3–4.2)

## 2024-03-18 PROCEDURE — 84443 ASSAY THYROID STIM HORMONE: CPT | Mod: ORL | Performed by: PHYSICIAN ASSISTANT

## 2024-04-24 ENCOUNTER — TRANSCRIBE ORDERS (OUTPATIENT)
Dept: OTHER | Age: 69
End: 2024-04-24

## 2024-04-24 DIAGNOSIS — E66.89 HYPERPLASTIC-HYPERTROPHIC OBESITY: Primary | ICD-10-CM

## 2024-07-03 ENCOUNTER — LAB REQUISITION (OUTPATIENT)
Dept: LAB | Facility: CLINIC | Age: 69
End: 2024-07-03
Payer: COMMERCIAL

## 2024-07-03 DIAGNOSIS — E78.5 HYPERLIPIDEMIA, UNSPECIFIED: ICD-10-CM

## 2024-07-03 LAB
ALBUMIN SERPL BCG-MCNC: 4.2 G/DL (ref 3.5–5.2)
ALP SERPL-CCNC: 65 U/L (ref 40–150)
ALT SERPL W P-5'-P-CCNC: 18 U/L (ref 0–50)
ANION GAP SERPL CALCULATED.3IONS-SCNC: 9 MMOL/L (ref 7–15)
AST SERPL W P-5'-P-CCNC: 16 U/L (ref 0–45)
BILIRUB SERPL-MCNC: 0.2 MG/DL
BUN SERPL-MCNC: 22.1 MG/DL (ref 8–23)
CALCIUM SERPL-MCNC: 8.8 MG/DL (ref 8.8–10.2)
CHLORIDE SERPL-SCNC: 106 MMOL/L (ref 98–107)
CHOLEST SERPL-MCNC: 197 MG/DL
CREAT SERPL-MCNC: 0.9 MG/DL (ref 0.51–0.95)
DEPRECATED HCO3 PLAS-SCNC: 25 MMOL/L (ref 22–29)
EGFRCR SERPLBLD CKD-EPI 2021: 69 ML/MIN/1.73M2
FASTING STATUS PATIENT QL REPORTED: ABNORMAL
FASTING STATUS PATIENT QL REPORTED: ABNORMAL
GLUCOSE SERPL-MCNC: 143 MG/DL (ref 70–99)
HDLC SERPL-MCNC: 57 MG/DL
LDLC SERPL CALC-MCNC: 131 MG/DL
NONHDLC SERPL-MCNC: 140 MG/DL
POTASSIUM SERPL-SCNC: 4.3 MMOL/L (ref 3.4–5.3)
PROT SERPL-MCNC: 7 G/DL (ref 6.4–8.3)
SODIUM SERPL-SCNC: 140 MMOL/L (ref 135–145)
TRIGL SERPL-MCNC: 47 MG/DL

## 2024-07-03 PROCEDURE — 80061 LIPID PANEL: CPT | Mod: ORL | Performed by: PHYSICIAN ASSISTANT

## 2024-07-03 PROCEDURE — 80053 COMPREHEN METABOLIC PANEL: CPT | Mod: ORL | Performed by: PHYSICIAN ASSISTANT

## 2024-08-19 ENCOUNTER — TELEPHONE (OUTPATIENT)
Dept: SURGERY | Facility: CLINIC | Age: 69
End: 2024-08-19

## 2024-08-19 ENCOUNTER — VIRTUAL VISIT (OUTPATIENT)
Dept: SURGERY | Facility: CLINIC | Age: 69
End: 2024-08-19
Payer: COMMERCIAL

## 2024-08-19 VITALS — HEIGHT: 62 IN | WEIGHT: 185 LBS | BODY MASS INDEX: 34.04 KG/M2

## 2024-08-19 DIAGNOSIS — Z86.39 HISTORY OF ELEVATED GLUCOSE: ICD-10-CM

## 2024-08-19 DIAGNOSIS — M17.9 OSTEOARTHRITIS OF KNEE, UNSPECIFIED LATERALITY, UNSPECIFIED OSTEOARTHRITIS TYPE: ICD-10-CM

## 2024-08-19 DIAGNOSIS — E66.09 CLASS 1 OBESITY DUE TO EXCESS CALORIES WITH BODY MASS INDEX (BMI) OF 33.0 TO 33.9 IN ADULT: ICD-10-CM

## 2024-08-19 DIAGNOSIS — E66.9 OBESITY (BMI 30-39.9): Primary | ICD-10-CM

## 2024-08-19 DIAGNOSIS — E66.811 CLASS 1 OBESITY DUE TO EXCESS CALORIES WITH SERIOUS COMORBIDITY IN ADULT, UNSPECIFIED BMI: Primary | ICD-10-CM

## 2024-08-19 DIAGNOSIS — E66.811 CLASS 1 OBESITY DUE TO EXCESS CALORIES WITH BODY MASS INDEX (BMI) OF 33.0 TO 33.9 IN ADULT: ICD-10-CM

## 2024-08-19 DIAGNOSIS — E66.09 CLASS 1 OBESITY DUE TO EXCESS CALORIES WITH SERIOUS COMORBIDITY IN ADULT, UNSPECIFIED BMI: Primary | ICD-10-CM

## 2024-08-19 DIAGNOSIS — E78.5 DYSLIPIDEMIA: ICD-10-CM

## 2024-08-19 PROBLEM — T39.395A STOMACH ULCER DUE TO NONSTEROIDAL ANTI-INFLAMMATORY DRUG (NSAID): Status: ACTIVE | Noted: 2022-09-28

## 2024-08-19 PROBLEM — K25.9 STOMACH ULCER DUE TO NONSTEROIDAL ANTI-INFLAMMATORY DRUG (NSAID): Status: ACTIVE | Noted: 2022-09-28

## 2024-08-19 PROBLEM — D62 ACUTE BLOOD LOSS ANEMIA: Status: ACTIVE | Noted: 2022-09-28

## 2024-08-19 PROCEDURE — 97802 MEDICAL NUTRITION INDIV IN: CPT | Mod: 95 | Performed by: DIETITIAN, REGISTERED

## 2024-08-19 PROCEDURE — 99205 OFFICE O/P NEW HI 60 MIN: CPT | Mod: 95 | Performed by: FAMILY MEDICINE

## 2024-08-19 RX ORDER — ROSUVASTATIN CALCIUM 20 MG/1
20 TABLET, COATED ORAL AT BEDTIME
COMMUNITY
Start: 2024-07-05

## 2024-08-19 RX ORDER — PHENTERMINE HYDROCHLORIDE 37.5 MG/1
TABLET ORAL
Qty: 90 TABLET | Refills: 0 | Status: SHIPPED | OUTPATIENT
Start: 2024-08-19

## 2024-08-19 RX ORDER — VITAMIN B COMPLEX
1 CAPSULE ORAL
COMMUNITY

## 2024-08-19 NOTE — LETTER
"2024      Guillermina Lopez  [address] [place] [address]      Dear Colleague,    Thank you for referring your patient, Guillermina Lopez, to the [facility] [place]. Please see a copy of my visit note below.        New Medical Weight Management Consult    PATIENT:  Guillermina Lopez  MRN:         [id number]  :         1955  [place]:         2024        I had the pleasure of seeing  Guillermina Lopez. Full intake/assessment was done to determine barriers to weight loss success and develop a treatment plan. Guillermina Lopez is a 69 year old female interested in treatment of medical problems associated with excess weight. She has a height of 5' 2\", a weight of 185 lbs 0 oz, and the calculated Body mass index is 33.84 kg/m .    ASSESSMENT/PLAN:  1. Class 1 obesity due to excess calories with serious comorbidity in adult, unspecified BMI  We discussed healthy habits to assist with weight loss. She will work on planning meals ahead of time using the plate method for portion control and macronutrient proportions. She will try to get some protein in for breakfast. She will increase her activity including muscle building exercise.  We discussed medication that may assist with weight loss. Phentermine was prescribed as she did well on it in the past. Risks/ benefits and possible side effects were discussed and questions were answered. Written information was given.  She will monitor her blood pressure.    2. Dyslipidemia  This may improve with healthy habits and weight loss.     3. History of elevated glucose  This may improve with healthy habits and weight loss.     4. Osteoarthritis of knee, unspecified laterality, unspecified osteoarthritis type  Pain may improve with healthy habits and weight loss.         She has the following co-morbidities:        2024     2:10 PM   --   I have the following health issues associated with obesity High Cholesterol    GERD " "(Reflux)    Asthma    Osteoarthritis (joint disease)   I have the following symptoms associated with obesity Knee Pain               8/19/2024     2:10 PM   Referring Provider   Please name the provider who referred you to Medical Weight Management  If you do not know, please answer \"I Don't Know\" PCP           8/19/2024     2:10 PM   Weight History   How concerned are you about your weight? Very Concerned   I became overweight As an Adult   The following factors have contributed to my weight gain Change in Schedule    Eating Wrong Types of Food    Lack of Exercise   I have tried the following methods to lose weight Watching Portions or Calories    Exercise    Medications   My lowest weight since age 19 was 110   My highest weight since age 19 was 150   The most weight I have ever lost was (lbs) 5   I have the following family history of obesity/being overweight I am the only one in my immediate family who is overweight   How has your weight changed over the last year? Gained   How many pounds? 5     Patient feels that her weight contributes to her knee pain. This limits her activity. She tried phentermine a couple of years ago and it was helpful for her.         8/19/2024     2:10 PM   Diet Recall Review with Patient   If you do eat lunch, what types of food do you typically eat? B: skips (25%) or oatmeal or cereal and fruit  leftovers, salad, often protein and vegetables and sometimes starch   If you do eat supper, what types of food do you typically eat? lean meats, vegetable   If you do snack, what types of food do you typically eat? Fruits or peanut butter crackers, 2 per day   How many glasses of juice do you drink in a typical day? 0   How many of glasses of milk do you drink in a typical day? 0   How many 8oz glasses of sugar containing drinks such as Omero-Aid/sweet tea do you drink in a day? 0   How many cans/bottles of sugar pop/soda/tea/sports drinks do you drink in a day? 0   How many cans/bottles of " diet pop/soda/tea or sports drink do you drink in a day? 1   How often do you have a drink of alcohol? 2-4 Times a Month   If you do drink, how many drinks might you have in a day? 1 or 2   Water: 60 plus oz per day        8/19/2024     2:10 PM   Eating Habits   Generally, my meals include foods like these bread, pasta, rice, potatoes, corn, crackers, sweet dessert, pop, or juice A Few Times a Week   Generally, my meals include foods like these fried meats, brats, burgers, french fries, pizza, cheese, chips, or ice cream Once a Week   Eat fast food (like McDonalds, Burger Lucio, Taco Bell) Less Than Weekly   Eat at a buffet or sit-down restaurant Less Than Weekly   Eat most of my meals in front of the TV or computer A Few Times a Week   Often skip meals, eat at random times, have no regular eating times Once a Week   Rarely sit down for a meal but snack or graze throughout Never   Eat extra snacks between meals A Few Times a Week   Eat most of my food at the end of the day Never   Eat in the middle of the night or wake up at night to eat Never   Eat extra snacks to prevent or correct low blood sugar Never   Eat to prevent acid reflux or stomach pain Never   Worry about not having enough food to eat Never   I eat when I am depressed Less Than Weekly   I eat when I am stressed Less Than Weekly   I eat when I am bored A Few Times a Week   I eat when I am anxious Less Than Weekly   I eat when I am happy or as a reward Never   I feel hungry all the time even if I just have eaten Never   Feeling full is important to me Almost Everyday   I finish all the food on my plate even if I am already full Never   I can't resist eating delicious food or walk past the good food/smell Less Than Weekly   I eat/snack without noticing that I am eating Never   I eat when I am preparing the meal Less Than Weekly   I eat more than usual when I see others eating Never   I have trouble not eating sweets, ice cream, cookies, or chips if they  are around the house Less Than Weekly   I think about food all day Never   What foods, if any, do you crave? Sweets/Candy/Chocolate   Please list any other foods you crave? ice cream before cholestorel     Meals not prepared at home per week: 0-1         8/19/2024     2:10 PM   Amount of Food   I feel out of control when eating Never   I eat a large amount of food, like a loaf of bread, a box of cookies, a pint/quart of ice cream, all at once Never   I eat a large amount of food even when I am not hungry Never   I eat rapidly Monthly   I eat alone because I feel embarrassed and do not want others to see how much I have eaten Never   I eat until I am uncomfortably full Never   I feel bad, disgusted, or guilty after I overeat Monthly           8/19/2024     2:10 PM   Activity/Exercise History   How much of a typical 12 hour day do you spend sitting? Half the Day   How much of a typical 12 hour day do you spend lying down? Less Than Half the Day   How much of a typical day do you spend walking/standing? Half the Day   How many hours (not including work) do you spend on the TV/Video Games/Computer/Tablet/Phone? 2-3 Hours   How many times a week are you active for the purpose of exercise? 2-3 Times a Week, walking up and down stairs with ADLs, Picketube stretching videos, occasionally walks the track [facility] the [facility]   What keeps you from being more active? Pain    Shortness of Breath   How many total minutes do you spend doing some activity for the purpose of exercising when you exercise? 15-30 Minutes       PAST MEDICAL HISTORY:  Past Medical History:   Diagnosis Date     Arthritis      Asthma      Hyperlipemia      Shortness of breath      Uncomplicated asthma            8/19/2024     2:10 PM   Work/Social History Reviewed With Patient   My employment status is Retired   What is your marital status? /In a Relationship   If in a relationship, is your significant other overweight? Yes   If you have children,  are they overweight? No   Who do you live with?    Who does the food shopping? together           8/19/2024     2:10 PM   Mental Health History Reviewed With Patient   Have you ever been physically or sexually abused? No   How often in the past 2 weeks have you felt little interest or pleasure in doing things? Not at all   Over the past 2 weeks how often have you felt down, depressed, or hopeless? Not at all           8/19/2024     2:10 PM   Sleep History Reviewed With Patient   How many hours do you sleep at night? 7       MEDICATIONS:   Current Outpatient Medications   Medication Sig Dispense Refill     albuterol (ACCUNEB) 1.25 MG/3ML nebulizer solution Take 1 vial by nebulization every 6 hours as needed for shortness of breath / dyspnea or wheezing       albuterol (PROAIR HFA/PROVENTIL HFA/VENTOLIN HFA) 108 (90 BASE) MCG/ACT Inhaler Inhale 2 puffs into the lungs every 4 hours as needed for shortness of breath / dyspnea or wheezing       Montelukast Sodium (SINGULAIR PO) Take 10 mg by mouth At Bedtime       omeprazole (PRILOSEC) 20 MG DR capsule        rosuvastatin (CRESTOR) 20 MG tablet Take 20 mg by mouth at bedtime       vitamin (B COMPLEX) capsule Take 1 capsule by mouth       DULoxetine (CYMBALTA) 30 MG capsule TAKE 1 CAPSULE(30 MG) BY MOUTH DAILY 30 capsule 2     folic acid (FOLVITE) 1 MG tablet folic acid 1 mg tablet       ibuprofen (ADVIL/MOTRIN) 600 MG tablet ibuprofen 600 mg tablet       mometasone (ASMANEX) 220 MCG/INH Inhaler Inhale into the lungs every evening       ofloxacin (OCUFLOX) 0.3 % ophthalmic solution Place 1 drop into the right eye 4 times daily 1 Bottle 0     Simvastatin (ZOCOR PO) Take 20 mg by mouth At Bedtime       ROS  General  Fatigue: sometimes  HEENT  Hx of glaucoma: no  Cardiovascular  Hx of heart disease: none  Pulmonary  Shortness of breath at rest: no  Shortness of breath with exertion: yes  Gastrointestinal  Pancreatitis: no  Psychiatric  Moods Stable:  yes  Endocrine  Polydipsia: no  No personal or family history of medullary thyroid cancer: no  No personal or family history of MEN2   Neurologic  Hx of seizures: no  Migraine headaches: no    Birth control: menopause  Kidney stones: no     ALLERGIES:   No Known Allergies    PHYSICAL EXAM:  Wt Readings from Last 4 Encounters:   08/19/24 83.9 kg (185 lb)   03/09/22 85.8 kg (189 lb 3.2 oz)   02/03/22 88 kg (194 lb 1.6 oz)   09/30/20 81.6 kg (180 lb)      BP Readings from Last 3 Encounters:   03/09/22 (!) 140/94   02/03/22 (!) 150/80   09/07/17 127/83    Patient states that her last BP [facility] was normal    GENERAL: alert and no distress  EYES: Eyes grossly normal to inspection.  No discharge or erythema, or obvious scleral/conjunctival abnormalities.  RESP: No audible wheeze, cough, or visible cyanosis.    SKIN: Visible skin clear. No significant rash, abnormal pigmentation or lesions.  NEURO: Cranial nerves grossly intact.  Mentation and speech appropriate for age.  PSYCH: Appropriate affect, tone, and pace of words     FOLLOW-UP:   In 3-4 months with myself    Total time spent on the date of this encounter doing: chart review, review of test results, patient visit, physical exam, education, counseling, developing plan of care and documenting = 67 minutes.     Sincerely,    ROGERIO Adams MD          Virtual Visit Details    Type of service:  Video Visit   Video Start Time:  2:23 pm  Video End Time: 2:51 pm    Originating Location (pt. Location): Home    Distant Location (provider location):  Off-site  Platform used for Video Visit: AmWell    Again, thank you for allowing me to participate in the care of your patient.        Sincerely,        ROGERIO Adams MD

## 2024-08-19 NOTE — LETTER
8/19/2024      Guillermina Lopez  [address] [place] [address]      Dear Colleague,    Thank you for referring your patient, Guillermina Lopez, to the [facility] [place]. Please see a copy of my visit note below.    ..    Guillermina Lopez is a 69 year old who is being evaluated via a billable video visit.      How would you like to obtain your AVS? MyChart  If the video visit is dropped, the invitation should be resent by: Text to cell phone: [phone]  Will anyone else be joining your video visit? No          Medical Weight Loss Initial Diet Evaluation  Assessment:  This patient was referred by Dr. Adams for MNT as treatment for Obesity which is impacting high cholesterol, GERD, asthma, osteoarthritis, knee pain    Guillermina is presenting today for a new weight management nutrition consultation. Pt has had an initial appointment with Dr. Adams.    Weight loss medication: Phentermine.     Personal Goals: patient is having a hard time losing weight. Patient wants to stay healthy as she ages.    Anthropometrics:    Initial weight: 185 lbs  BMI: 33.84  Ideal body weight: 50.1 kg (110 lb 7.2 oz)  Adjusted ideal body weight: 63.6 kg (140 lb 4.3 oz)  Estimated RMR ([place]-St Jeor equation):  1,324 kcals x 1.2 (sedentary) = 1,589 kcals (for weight maintenance)  1,324 kcals x 1.3 (light active) = 1,821 kcals (for weight maintenance)    Recommended Protein Intake: 60 - 80 grams of protein/day    Medical History:  Patient Active Problem List   Diagnosis     Acute blood loss anemia     Stomach ulcer due to nonsteroidal anti-inflammatory drug (NSAID)   Diabetes: no    Nutrition History:   Food allergies/intolerances/cultural or religous food customs: No. Does not like fast food. Likes more lean cuts of meat, fruits, vegetables. Patient notices that she does not tolerate dairy at times.     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 [employer]     Vitamins/Mineral Supplementation: B12, D, C, magnesium, tumeric    Dietary Recall:  Breakfast: skips OR cereal with fairlife milk (1/2 cup) and fruit OR oatmeal  Lunch: leftovers OR skips  Dinner: salmon burger (no bun) with a fruit or vegetables  Typical Snacks: fruit, trail mix (homemade)  Overnight eating: No  Eating out: limited - only for special occasions    Beverages:   Water - 2-3 large cups/day  Juice sometimes - apple  Coffee - 1 cup/day  Sparkling Water - sometimes    Exercise:   No set routine at this time    Patient has been trying to walk - walks [facility] track indoors, does stretches    Knee replacement surgery in 2016 - previously was walking daily    Nutrition Diagnosis (PES statement):     Obesity related to excessive energy intake as evidence by BMI of 33.84     Disordered Eating Pattern (NB 1.5) related to obsessive desire, intake of food exceeding RMR as evidenced by skipping meals     Nutrition Intervention  Food and/or Nutrient Delivery   Placed emphasis on importance of developing a healthy meal routine, aiming for 3 meals a day and limited snacks.  Discussed using a protein supplement as nutrition support  Nutrition Education   Discussed with patient how to build a meal: the importance of including a lean/low fat protein at each meal, include a source of vegetables at a minimum of lunch and dinner and limiting carbohydrate intake to 30 - 45 per meal.  Educated on sources of lean protein, portion sizes, the amount of grams found in each source. Recommend patient to aim for 20g protein at each meal.  Discussed the importance of adequate hydration, with emphasis on drinking 64oz of water or zero calorie beverages per day.  Nutrition Counseling   Encouraged importance of developing routine exercise for health benefits and  weight loss.    Goals established by patient:   Aim to have three meals per day  Aim to have 20 grams of protein per meal  Try to create consistency with activity  Handouts provided:  Intro to MWM  High Protein Cereal Options  Protein Sources for Weight Loss  Portions Control without Measuring    Assessment/Plan:    Pt will follow up in 4 month(s) with bariatrician and 5 month(s) with dietitian.       Video-Visit Details    Type of service:  Video Visit    Video Start Time (time video started): 4:02 PM    Video End Time (time video stopped): 4:33 PM    Originating Location (pt. Location): Home      Distant Location (provider location):  Off-site    Mode of Communication:  Video Conference via [facility]    Physician has received verbal consent for a Video Visit from the patient? Yes      Rama Barton RD         Again, thank you for allowing me to participate in the care of your patient.        Sincerely,        Rama Barton RD

## 2024-08-19 NOTE — PROGRESS NOTES
Virtual Visit Details    Type of service:  Video Visit   Video Start Time:  2:23 pm  Video End Time: 2:51 pm    Originating Location (pt. Location): Home    Distant Location (provider location):  Off-site  Platform used for Video Visit: Kathy

## 2024-08-19 NOTE — PROGRESS NOTES
Guillermina Lopez is a 69 year old who is being evaluated via a billable video visit.      How would you like to obtain your AVS? MyChart  If the video visit is dropped, the invitation should be resent by: Text to cell phone: [phone]  Will anyone else be joining your video visit? No          Medical Weight Loss Initial Diet Evaluation  Assessment:  This patient was referred by Dr. Adams for MNT as treatment for Obesity which is impacting high cholesterol, GERD, asthma, osteoarthritis, knee pain    Guillermina is presenting today for a new weight management nutrition consultation. Pt has had an initial appointment with Dr. Adams.    Weight loss medication: Phentermine.     Personal Goals: patient is having a hard time losing weight. Patient wants to stay healthy as she ages.    Anthropometrics:    Initial weight: 185 lbs  BMI: 33.84  Ideal body weight: 50.1 kg (110 lb 7.2 oz)  Adjusted ideal body weight: 63.6 kg (140 lb 4.3 oz)  Estimated RMR ([place]-St Jeor equation):  1,324 kcals x 1.2 (sedentary) = 1,589 kcals (for weight maintenance)  1,324 kcals x 1.3 (light active) = 1,821 kcals (for weight maintenance)    Recommended Protein Intake: 60 - 80 grams of protein/day    Medical History:  Patient Active Problem List   Diagnosis    Acute blood loss anemia    Stomach ulcer due to nonsteroidal anti-inflammatory drug (NSAID)   Diabetes: no    Nutrition History:   Food allergies/intolerances/cultural or religous food customs: No. Does not like fast food. Likes more lean cuts of meat, fruits, vegetables. Patient notices that she does not tolerate dairy at times.     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 [employer]     Vitamins/Mineral Supplementation: B12, D, C, magnesium, tumeric    Dietary Recall:  Breakfast: skips OR  cereal with fairlife milk (1/2 cup) and fruit OR oatmeal  Lunch: leftovers OR skips  Dinner: salmon burger (no bun) with a fruit or vegetables  Typical Snacks: fruit, trail mix (homemade)  Overnight eating: No  Eating out: limited - only for special occasions    Beverages:   Water - 2-3 large cups/day  Juice sometimes - apple  Coffee - 1 cup/day  Sparkling Water - sometimes    Exercise:   No set routine at this time    Patient has been trying to walk - walks [facility] track indoors, does stretches    Knee replacement surgery in 2016 - previously was walking daily    Nutrition Diagnosis (PES statement):     Obesity related to excessive energy intake as evidence by BMI of 33.84     Disordered Eating Pattern (NB 1.5) related to obsessive desire, intake of food exceeding RMR as evidenced by skipping meals     Nutrition Intervention  Food and/or Nutrient Delivery   Placed emphasis on importance of developing a healthy meal routine, aiming for 3 meals a day and limited snacks.  Discussed using a protein supplement as nutrition support  Nutrition Education   Discussed with patient how to build a meal: the importance of including a lean/low fat protein at each meal, include a source of vegetables at a minimum of lunch and dinner and limiting carbohydrate intake to 30 - 45 per meal.  Educated on sources of lean protein, portion sizes, the amount of grams found in each source. Recommend patient to aim for 20g protein at each meal.  Discussed the importance of adequate hydration, with emphasis on drinking 64oz of water or zero calorie beverages per day.  Nutrition Counseling   Encouraged importance of developing routine exercise for health benefits and weight loss.    Goals established by patient:   Aim to have three meals per day  Aim to have 20 grams of protein per meal  Try to create consistency with activity  Handouts provided:  Intro to [facility]  High Protein Cereal Options  Protein Sources for Weight Loss  Portions  Control without Measuring    Assessment/Plan:    Pt will follow up in 4 month(s) with bariatrician and 5 month(s) with dietitian.       Video-Visit Details    Type of service:  Video Visit    Video Start Time (time video started): 4:02 PM    Video End Time (time video stopped): 4:33 PM    Originating Location (pt. Location): Home      Distant Location (provider location):  Off-site    Mode of Communication:  Video Conference via [facility]    Physician has received verbal consent for a Video Visit from the patient? Yes      Rama Barton RD

## 2024-08-19 NOTE — NURSING NOTE
Is the patient currently in the state of [place]? YES    Visit mode:VIDEO    If the visit is dropped, the patient can be reconnected by: VIDEO VISIT: Text to cell phone:   Telephone Information:   Mobile [phone]    and VIDEO VISIT: Send to e-mail at: [email or web address]    Will anyone else be joining the visit? NO  (If patient encounters technical issues they should call [phone]    How would you like to obtain your AVS? Mail a copy    Are changes needed to the allergy or medication list? No    Are refills needed on medications prescribed by this physician? NO    Reason for visit: Consult    Monica GOULD

## 2024-08-19 NOTE — PATIENT INSTRUCTIONS
Eat Better ? Move More ? Live Well    Eat 3 nutrient-rich meals each day    Don t skip meals--it will cause you to overeat later in the day!    Eating fiber (vegetables/fruits/whole grains) and protein with meals helps you stay full longer    Choose foods with less than 10 grams of sugar and 5 grams of fat per serving to prevent excess calories and weight gain  Eat around the same times each day to develop a routine eating schedule   Avoid snacking unless physically hungry.   Planned snacks: 1-2 times per day and no more than 150 calories    Eat protein first   Protein helps with healing, maintaining adequate muscle mass, reducing hunger and optimizing nutritional status   Aim for 60-80 grams of protein per day   Fill up on Fiber   Fiber comes from plants--fruits, veggies, whole grains, nuts/seeds and beans   Fiber is low in calories, high in phytonutrients and helps you stay full longer   Aim for 25-35 grams per day by eating fiber with meals and snacks  Eat [id number]   Take 20-30 minutes to eat each meal by taking small bites, chewing foods to applesauce consistency or 20-30 times before you swallow   Eating foods too fast can delay satiety/fullness signals and increase overeating   Slow down your eating by using toddler utensils, putting your fork/spoon down between bites and not watching TV or emailing during meals!   Keep a Journal         Writing down what you eat, how you feel and when you are active helps you identify new changes to work on from week to week         Look for ways to cut 100 calories from your current diet 2-3 times per day  Drink 64 ounces of 0-Calorie drinks between meals   Water   Zero calorie Propel  or Vitamin Water     So[employer]  Zero Calories   Crystal Light , Sugar-Free Omero-Aid , and other sugar-free lemonade or flavored coates   Keep Caffeine to less than 300mg per day ie: 3-6oz cups coffee     Work up to 45-60 minutes of physical activity most days of the week   Helps with  losing weight and prevent regaining those extra pounds!    Do a combo of cardio (walking/water exercises) and strength training (lifting weights/Vinyasa yoga)    Avoid Mindless Eating   Be present when you eat--take note of the smell, taste and quality of your food   Make a list of alternative activities you could do to prevent eating out of boredom/stress  Go for a walk, call a friend, chew gum, paint your nails, re-organize the garage, etc            Here are the [employer] exercise sites:    Yoga-https://www.[employer].GuidePal/user/yogawithadriene    Body strength activities, dance, high intensity interval training- https://www.[employer].GuidePal/user/popsugartvfit    Strength training- https://www.[employer].GuidePal/user/KozakSportsPerform    Walking- https://www.[employer].GuidePal/user/walkathomemedia    Sit and Be Fit- https://www.[employer].GuidePal/channel/[email or web address]_g    Low impact cardio- Beryl Lezama- https://www.[employer].com/channel/[email or web address]    Cardio Ynes Jolley- https://www.[employer].com/channel/[email or web address]    30 Min low impact cardio- https://www.[employer].com/watch?v=gC_L9qAHVJ8    Body Project- https://www.[employer].GuidePal/channel/[email or web address]

## 2024-08-19 NOTE — PATIENT INSTRUCTIONS
Eat Better ? Move More ? Live Well    Eat 3 nutrient-rich meals each day     Don't skip meals--it will cause you to overeat later in the day!     Eating fiber (vegetables/fruits/whole grains) and protein with meals helps you stay full longer     Choose foods with less than 10 grams of sugar and 5 grams of fat per serving to prevent excess calories and weight re-gain   Eat around the same times each day to develop a routine eating schedule    Avoid snacking unless physically hungry.   Planned snacks: 1-2 times per day and no more than 150 calories    Eat protein first    Protein helps with healing, maintaining adequate muscle mass, reducing hunger and optimizing nutritional status    Aim for 60 - 80 grams of protein per day   Fill up on Fiber    Fiber comes from plants--fruits, veggies, whole grains, nuts/seeds and beans    Fiber is low in calories, high in phytonutrients and helps you stay full longer    Aim for 25-35 grams per day by eating fiber with meals and snacks  Eat S-[id number]-Y    Take 20-30 minutes to eat each meal by taking small bites, chewing foods to applesauce consistency or 20-30 times before you swallow    Eating foods too fast can delay satiety/fullness signals and increase overeating   Slow down your eating by using toddler utensils, putting your fork/spoon down between bites and not watching TV or emailing during meals!   Keep a Journal          Writing down what you eat, how you feel and when you are active helps you identify new changes to work on from week to week          Look for ways to cut 100 calories from your current diet 2-3 times per day  Drink 64 ounces of 0-Calorie drinks between meals    Water    Zero calorie Propel  or Vitamin Water      SoBe Lifewater  Zero Calories    Crystal Light , Sugar-Free Omero-Aid , and other sugar-free lemonade or flavored coates    Keep Caffeine to less than 300mg per day ie: 3-6oz cups coffee     Work up to 45-60 minutes of physical activity most days of  the week    Helps with losing weight and prevent regaining those extra pounds!     Do a combo of cardio (walking/water exercises) and strength training (lifting weights/Vinyasa yoga)    Avoid Mindless Eating    Be present when you eat--take note of the smell, taste and quality of your food    Make a list of alternative activities you could do to prevent eating out of boredom/stress  Go for a walk, call a friend, chew gum, paint your nails, re-organize the garage, etc      LEAN PROTEIN SOURCES    Protein Source Portion Calories Grams of Protein                           Nonfat, plain Greek yogurt    (10 grams sugar or less) 3/4 cup (6 oz)  12-17   Light Yogurt (10 grams sugar or less) 3/4 cup (6 oz)  6-8   Protein Shake 1 shake 110-180 15-30   Skim/1% Milk or lactose-free milk 1 cup ( 8 oz)  8   Plain or light, flavored soymilk 1 cup  7-8   Plain or light, hemp milk 1 cup 110 6   Fat Free or 1% Cottage Cheese 1/2 cup 90 15   Part skim ricotta cheese 1/2 cup 100 14   Part skim or reduced fat cheese slices 1/4cup, 3 dice 65-80 8     Mozzarella String Cheese 1 80 8   Canned tuna, chicken, crab or salmon  (canned in water)  1/2 cup 100 15-20   White fish (broiled, grilled, baked) 3 ounces 100 21   [place]/Tuna (broiled, grilled, baked) 3 ounces 150-180 21   Shrimp, Scallops, Lobster, Crab 3 ounces 100 21   Pork loin, Pork Tenderloin 3 ounces 150 21   Boneless, skinless chicken /turkey breast                          (broiled, grilled, baked) 3 ounces 120 21   [place], [place], [place], and Venison 3 ounces 120 21   Lean cuts of red meat and pork (sirloin,   round, tenderloin, flank, ground 93%-96%) 3 ounces 170 21   Lean or Extra Lean Ground Turkey 1/2 cup 150 20   90-95% Lean [place]ley Burger 1 jeermiah 140-180 21   Low-fat casserole with lean meat 3/4 cup 200 17   Luncheon Meats                                                        (turkey, lean ham, roast beef, chicken) 3 ounces 100 21   Egg (boiled,  poached, scrambled) 1 Egg 60 7   Egg Substitute 1/2 cup 70 10   Nuts (limit to 1 serving per day)  3 Tbsp. 150 7   Nut [place] (peanut, almond)  Limit to 1 serving or less daily 1 Tbsp. 90 4   Soy Burger (varies) 1  10-15   Edamame  1/2 cup ~95 9   Garbanzo, Black, Jimenez Beans 1/2 cup 110 7   Refried Beans 1/2 cup 100 7   Kidney and Lima beans 1/2 cup 110 7   Tempeh 3 oz 175 18   Vegan crumbles 1/2 cup 100 14   Tofu 1/2 cup 110 14   Chili (beans and extra lean beef or turkey) 1 cup 200 23   Lentil Stew/Soup 1 cup 150 12   Black Bean Soup 1 cup 175 12     Carbohydrates  Carbohydrates fuel your body with glucose (sugar)--the energy your body needs so you can do your daily activities.  Carbohydrates offer an immediate source of energy for your body. They provide the fuel for your muscles and organs, such as your brain.     Types of Carbohydrates     Complex Carbohydrates are higher in fiber and keep you feeling full longer--helping you eat less.   These are found in nearly all plant-based foods and usually take longer for the body to digest.  They are most commonly found in whole-wheat bread, whole-grain pasta, brown rice, starchy vegetables,   and fruits  Refined Carbohydrates require almost NO WORK for digestion and break down into glucose more quickly   than complex carbohydrates. Refined carbohydrates are usually high in calories and low in nutrients and fiber--  eating more of these can lead to weight gain.  Thinking about eliminating carbohydrates???  If you do not eat enough carbohydrates, the following can occur:  Fatigue  Muscle cramps  Poor mental function  Fatigue easily results from deprivation of carbohydrates, which is seen in people who fast, possibly interfering with activities of daily living.      Thinking about eliminating carbohydrates???  If you do not eat enough carbohydrates, the following can occur:  Fatigue  Muscle cramps  Poor mental function  Fatigue easily results from deprivation of  carbohydrates, which is seen in people who fast, possibly interfering with activities of daily living    Carbohydrates are your body's first choice for fuel. If given a choice of several types of foods simultaneously, your body will use the energy from carbohydrates first.    What foods contain carbohydrates?  Choose the following foods containing carbohydrates (the BEST ones to eat):   Fruit-fresh, frozen, canned in their own juices  Whole grains:  Whole-wheat breads  Brown rice  Oatmeal  Whole-grain cereals  Other starchy foods containing a minimum of 3 grams (g) fiber/100 calories  The ingredient label should list whole wheat or whole grain as one of the first ingredients (bulgur, quinoa, buckwheat, millet, spelt, faro, kasha)  Milk or yogurt (a natural source of carbohydrates):  Low-fat milk  Fat-free milk  Yogurt   Beans or legumes     Starchy vegetables, raw or frozen:  Potatoes  Peas  Corn    AVOID or limit the following foods containing carbohydrates:  Refined sugars, such as in:  Candy  Desserts-ice cream, cakes, pies, brownies, frozen yogurt, sherbet/sorbet  Cookies  White flour: bread/pasta/crackers/rice/tortillas  Sugary snacks: sweetened cereal, granola bars, cereal bars, donuts, muffins, bagels  Sugary Drinks:  Fruit Juice, Smoothies  Sports Drinks  Regular Soda    What are typical serving sizes or portions?  The following are some serving and portion sizes for foods containing carbohydrates:  One medium piece of fruit, about 4?5 ounces (oz) (-tennis ball)  1 cup (C) berries or melon    C canned fruit    C juice (100% vegetable)    C starchy vegetables, cooked or chopped  One slice whole-grain bread  ? C brown rice, quinoa, buckwheat, millet, spelt, faro, kasha    C oatmeal (dry)    C bulgur  One small tortilla (less than 6inch diameter)    C wheat germ  1 oz pretzels     C flaked cereal        Calorie-Controlled Sample Meal Plans    Examples of small healthy meals    Breakfast   Omelet made with   cup  to   cup egg substitute or 2 eggs    cup chopped vegetables  1-2 tbsp. of light cheese     cup salsa  Medium banana    1 cup non-fat plain, Greek yogurt mixed with 1 cup berries and 1-2 Tbsp nuts or cereal   -3/4 cup skim or 1% cottage cheese    cup unsweetened whole-grain cereal  1/2 cup of fresh strawberries  Whole-wheat English muffin or mini bagel, 1 scrambled egg and 1 slice Swiss cheese   Small orange  Protein Bar or Shake (15-30 grams protein and 15-25 grams Carbohydrates)    cup cottage cheese, low-fat    cup fresh fruit    11 ounces of Slim Fast Low Carb (only), Kenneth's Advantage, EAS Carb Control    Lunch/Dinner  2-3 slices roasted turkey breast  1 tbsp. of fat free mayonnaise  2 slices of  whole-wheat bread, Medium apple  10 baby carrots with 1 tbsp. of low-fat dip     cup water packed tuna or chicken  1 tablespoons of low-fat mayonnaise  1-2 tbsp. dill relish  1 serving of whole-grain crackers  1 cup of strawberries   6 inch turkey sub sandwich with light mayonnaise,   cup cottage cheese                                                                                                                                                      Black bean and low-fat cheese on a whole wheat tortilla with salsa and light sour cream  Grilled chicken sandwich  Tossed salad with light dressing    Baked potato with 3/4 cup of extra lean ground beef, light shredded cheese and salsa  Fresh fruit                                                 Chicken chunks with lettuce and vegetables stuffed in aguila  Steamed broccoli                                                 3 oz boneless/skinless chicken breast  1/2 cup brown rice with stir-fried vegetables    grapefruit  3 ounces of salmon, trout, or tuna  1 cup of steamed asparagus  1 small slice whole grain Italian bread  Broiled white or pink fish  3/4 cup whole wheat pasta with tomatoes  3/4 cup of roasted red peppers  3 oz. of extra lean (93/7) hamburger on a Arnold[employer]  [place] Thins  Tossed salad with light dressing       Black bean or Tuscan bean soup with grated mozzarella cheese    of a flour tortilla    3 ounces of grilled pork loin with 1 tbsp. of low-sugar barbeque sauce, 1 cup of green beans seasoned with pepper  Small dinner roll or   cup of grapefruit sections    1-2 cups of torn kirill    cup of garbanzo beans or diced skinless chicken breast  5-6 cherry tomatoes  1  tbsp. of crumbled feta cheese  1 tbsp. of roasted soy nuts  1 tsp. of olive oil and 2-3 Tbsp. of balsamic or red wine vinegar  Small whole-wheat dinner roll or   cup of cut up pineapple       High Protein Cereal Options:   Kashi Go: 9-14 g protein per serving (depending on flavor)  Premier Protein Cereal: 20 g protein per serving  Special K High Protein: 10 - 20 g per serving  Magic Spoon: 12-14 g per serving (depending on flavor)  :ratio Brand: 10 g protein per serving  Cataline Crunch: 9 g per serving       Protein Sources for Weight Loss       Portion Control without Measuring

## 2024-08-19 NOTE — TELEPHONE ENCOUNTER
Attemtemted to reach pt to make follow up visit, LM with  appt details and to call [phone] if changes are needed.

## 2024-08-19 NOTE — PROGRESS NOTES
"    New Medical Weight Management Consult    PATIENT:  Guillermina Lopez  MRN:         [id number]  :         1955  GLENROY:         2024        I had the pleasure of seeing  Guillermina Lopez. Full intake/assessment was done to determine barriers to weight loss success and develop a treatment plan. Guillermina Lopez is a 69 year old female interested in treatment of medical problems associated with excess weight. She has a height of 5' 2\", a weight of 185 lbs 0 oz, and the calculated Body mass index is 33.84 kg/m .    ASSESSMENT/PLAN:  1. Class 1 obesity due to excess calories with serious comorbidity in adult, unspecified BMI  We discussed healthy habits to assist with weight loss. She will work on planning meals ahead of time using the plate method for portion control and macronutrient proportions. She will try to get some protein in for breakfast. She will increase her activity including muscle building exercise.  We discussed medication that may assist with weight loss. Phentermine was prescribed as she did well on it in the past. Risks/ benefits and possible side effects were discussed and questions were answered. Written information was given.  She will monitor her blood pressure.    2. Dyslipidemia  This may improve with healthy habits and weight loss.     3. History of elevated glucose  This may improve with healthy habits and weight loss.     4. Osteoarthritis of knee, unspecified laterality, unspecified osteoarthritis type  Pain may improve with healthy habits and weight loss.         She has the following co-morbidities:        2024     2:10 PM   --   I have the following health issues associated with obesity High Cholesterol    GERD (Reflux)    Asthma    Osteoarthritis (joint disease)   I have the following symptoms associated with obesity Knee Pain               2024     2:10 PM   Referring Provider   Please name the provider who referred you to Medical Weight Management  If you do not " "know, please answer \"I Don't Know\" PCP           8/19/2024     2:10 PM   Weight History   How concerned are you about your weight? Very Concerned   I became overweight As an Adult   The following factors have contributed to my weight gain Change in Schedule    Eating Wrong Types of Food    Lack of Exercise   I have tried the following methods to lose weight Watching Portions or Calories    Exercise    Medications   My lowest weight since age 19 was 110   My highest weight since age 19 was 150   The most weight I have ever lost was (lbs) 5   I have the following family history of obesity/being overweight I am the only one in my immediate family who is overweight   How has your weight changed over the last year? Gained   How many pounds? 5     Patient feels that her weight contributes to her knee pain. This limits her activity. She tried phentermine a couple of years ago and it was helpful for her.         8/19/2024     2:10 PM   Diet Recall Review with Patient   If you do eat lunch, what types of food do you typically eat? B: skips (25%) or oatmeal or cereal and fruit  leftovers, salad, often protein and vegetables and sometimes starch   If you do eat supper, what types of food do you typically eat? lean meats, vegetable   If you do snack, what types of food do you typically eat? Fruits or peanut butter crackers, 2 per day   How many glasses of juice do you drink in a typical day? 0   How many of glasses of milk do you drink in a typical day? 0   How many 8oz glasses of sugar containing drinks such as Omero-Aid/sweet tea do you drink in a day? 0   How many cans/bottles of sugar pop/soda/tea/sports drinks do you drink in a day? 0   How many cans/bottles of diet pop/soda/tea or sports drink do you drink in a day? 1   How often do you have a drink of alcohol? 2-4 Times a Month   If you do drink, how many drinks might you have in a day? 1 or 2   Water: 60 plus oz per day        8/19/2024     2:10 PM   Eating Habits "   Generally, my meals include foods like these bread, pasta, rice, potatoes, corn, crackers, sweet dessert, pop, or juice A Few Times a Week   Generally, my meals include foods like these fried meats, brats, burgers, french fries, pizza, cheese, chips, or ice cream Once a Week   Eat fast food (like McDonalds, Burger Lucio, Taco Bell) Less Than Weekly   Eat at a buffet or sit-down restaurant Less Than Weekly   Eat most of my meals in front of the TV or computer A Few Times a Week   Often skip meals, eat at random times, have no regular eating times Once a Week   Rarely sit down for a meal but snack or graze throughout Never   Eat extra snacks between meals A Few Times a Week   Eat most of my food at the end of the day Never   Eat in the middle of the night or wake up at night to eat Never   Eat extra snacks to prevent or correct low blood sugar Never   Eat to prevent acid reflux or stomach pain Never   Worry about not having enough food to eat Never   I eat when I am depressed Less Than Weekly   I eat when I am stressed Less Than Weekly   I eat when I am bored A Few Times a Week   I eat when I am anxious Less Than Weekly   I eat when I am happy or as a reward Never   I feel hungry all the time even if I just have eaten Never   Feeling full is important to me Almost Everyday   I finish all the food on my plate even if I am already full Never   I can't resist eating delicious food or walk past the good food/smell Less Than Weekly   I eat/snack without noticing that I am eating Never   I eat when I am preparing the meal Less Than Weekly   I eat more than usual when I see others eating Never   I have trouble not eating sweets, ice cream, cookies, or chips if they are around the house Less Than Weekly   I think about food all day Never   What foods, if any, do you crave? Sweets/Candy/Chocolate   Please list any other foods you crave? ice cream before cholestorel     Meals not prepared at home per week: 0-1          8/19/2024     2:10 PM   Amount of Food   I feel out of control when eating Never   I eat a large amount of food, like a loaf of bread, a box of cookies, a pint/quart of ice cream, all at once Never   I eat a large amount of food even when I am not hungry Never   I eat rapidly Monthly   I eat alone because I feel embarrassed and do not want others to see how much I have eaten Never   I eat until I am uncomfortably full Never   I feel bad, disgusted, or guilty after I overeat Monthly           8/19/2024     2:10 PM   Activity/Exercise History   How much of a typical 12 hour day do you spend sitting? Half the Day   How much of a typical 12 hour day do you spend lying down? Less Than Half the Day   How much of a typical day do you spend walking/standing? Half the Day   How many hours (not including work) do you spend on the TV/Video Games/Computer/Tablet/Phone? 2-3 Hours   How many times a week are you active for the purpose of exercise? 2-3 Times a Week, walking up and down stairs with ADLs, Weddingful stretching videos, occasionally walks the track at the [facility]   What keeps you from being more active? Pain    Shortness of Breath   How many total minutes do you spend doing some activity for the purpose of exercising when you exercise? 15-30 Minutes       PAST MEDICAL HISTORY:  Past Medical History:   Diagnosis Date    Arthritis     Asthma     Hyperlipemia     Shortness of breath     Uncomplicated asthma            8/19/2024     2:10 PM   Work/Social History Reviewed With Patient   My employment status is Retired   What is your marital status? /In a Relationship   If in a relationship, is your significant other overweight? Yes   If you have children, are they overweight? No   Who do you live with?    Who does the food shopping? together           8/19/2024     2:10 PM   Mental Health History Reviewed With Patient   Have you ever been physically or sexually abused? No   How often in the past 2 weeks  have you felt little interest or pleasure in doing things? Not at all   Over the past 2 weeks how often have you felt down, depressed, or hopeless? Not at all           8/19/2024     2:10 PM   Sleep History Reviewed With Patient   How many hours do you sleep at night? 7       MEDICATIONS:   Current Outpatient Medications   Medication Sig Dispense Refill    albuterol (ACCUNEB) 1.25 MG/3ML nebulizer solution Take 1 vial by nebulization every 6 hours as needed for shortness of breath / dyspnea or wheezing      albuterol (PROAIR HFA/PROVENTIL HFA/VENTOLIN HFA) 108 (90 BASE) MCG/ACT Inhaler Inhale 2 puffs into the lungs every 4 hours as needed for shortness of breath / dyspnea or wheezing      Montelukast Sodium (SINGULAIR PO) Take 10 mg by mouth At Bedtime      omeprazole (PRILOSEC) 20 MG DR capsule       rosuvastatin (CRESTOR) 20 MG tablet Take 20 mg by mouth at bedtime      vitamin (B COMPLEX) capsule Take 1 capsule by mouth      DULoxetine (CYMBALTA) 30 MG capsule TAKE 1 CAPSULE(30 MG) BY MOUTH DAILY 30 capsule 2    folic acid (FOLVITE) 1 MG tablet folic acid 1 mg tablet      ibuprofen (ADVIL/MOTRIN) 600 MG tablet ibuprofen 600 mg tablet      mometasone (ASMANEX) 220 MCG/INH Inhaler Inhale into the lungs every evening      ofloxacin (OCUFLOX) 0.3 % ophthalmic solution Place 1 drop into the right eye 4 times daily 1 Bottle 0    Simvastatin (ZOCOR PO) Take 20 mg by mouth At Bedtime       ROS  General  Fatigue: sometimes  HEENT  Hx of glaucoma: no  Cardiovascular  Hx of heart disease: none  Pulmonary  Shortness of breath at rest: no  Shortness of breath with exertion: yes  Gastrointestinal  Pancreatitis: no  Psychiatric  Moods Stable: yes  Endocrine  Polydipsia: no  No personal or family history of medullary thyroid cancer: no  No personal or family history of MEN2   Neurologic  Hx of seizures: no  Migraine headaches: no    Birth control: menopause  Kidney stones: no     ALLERGIES:   No Known Allergies    PHYSICAL  EXAM:  Wt Readings from Last 4 Encounters:   08/19/24 83.9 kg (185 lb)   03/09/22 85.8 kg (189 lb 3.2 oz)   02/03/22 88 kg (194 lb 1.6 oz)   09/30/20 81.6 kg (180 lb)      BP Readings from Last 3 Encounters:   03/09/22 (!) 140/94   02/03/22 (!) 150/80   09/07/17 127/83    Patient states that her last BP at [facility] was normal    GENERAL: alert and no distress  EYES: Eyes grossly normal to inspection.  No discharge or erythema, or obvious scleral/conjunctival abnormalities.  RESP: No audible wheeze, cough, or visible cyanosis.    SKIN: Visible skin clear. No significant rash, abnormal pigmentation or lesions.  NEURO: Cranial nerves grossly intact.  Mentation and speech appropriate for age.  PSYCH: Appropriate affect, tone, and pace of words     FOLLOW-UP:   In 3-4 months with myself    Total time spent on the date of this encounter doing: chart review, review of test results, patient visit, physical exam, education, counseling, developing plan of care and documenting = 67 minutes.     Sincerely,    ROGERIO Adams MD

## 2024-12-18 ENCOUNTER — LAB REQUISITION (OUTPATIENT)
Dept: LAB | Facility: CLINIC | Age: 69
End: 2024-12-18
Payer: COMMERCIAL

## 2024-12-18 DIAGNOSIS — E78.49 OTHER HYPERLIPIDEMIA: ICD-10-CM

## 2024-12-18 PROCEDURE — 82465 ASSAY BLD/SERUM CHOLESTEROL: CPT | Performed by: PHYSICIAN ASSISTANT

## 2024-12-18 PROCEDURE — 80048 BASIC METABOLIC PNL TOTAL CA: CPT | Performed by: PHYSICIAN ASSISTANT

## 2024-12-18 PROCEDURE — 84075 ASSAY ALKALINE PHOSPHATASE: CPT | Performed by: PHYSICIAN ASSISTANT

## 2024-12-19 ENCOUNTER — PATIENT OUTREACH (OUTPATIENT)
Dept: CARE COORDINATION | Facility: CLINIC | Age: 69
End: 2024-12-19
Payer: COMMERCIAL

## 2024-12-19 LAB
ALBUMIN SERPL BCG-MCNC: 4.4 G/DL (ref 3.5–5.2)
ALP SERPL-CCNC: 75 U/L (ref 40–150)
ALT SERPL W P-5'-P-CCNC: 16 U/L (ref 0–50)
ANION GAP SERPL CALCULATED.3IONS-SCNC: 11 MMOL/L (ref 7–15)
AST SERPL W P-5'-P-CCNC: 15 U/L (ref 0–45)
BILIRUB SERPL-MCNC: 0.2 MG/DL
BUN SERPL-MCNC: 22.2 MG/DL (ref 8–23)
CALCIUM SERPL-MCNC: 8.9 MG/DL (ref 8.8–10.4)
CHLORIDE SERPL-SCNC: 105 MMOL/L (ref 98–107)
CHOLEST SERPL-MCNC: 159 MG/DL
CREAT SERPL-MCNC: 0.93 MG/DL (ref 0.51–0.95)
EGFRCR SERPLBLD CKD-EPI 2021: 67 ML/MIN/1.73M2
FASTING STATUS PATIENT QL REPORTED: ABNORMAL
FASTING STATUS PATIENT QL REPORTED: NORMAL
GLUCOSE SERPL-MCNC: 102 MG/DL (ref 70–99)
HCO3 SERPL-SCNC: 24 MMOL/L (ref 22–29)
HDLC SERPL-MCNC: 54 MG/DL
LDLC SERPL CALC-MCNC: 95 MG/DL
NONHDLC SERPL-MCNC: 105 MG/DL
POTASSIUM SERPL-SCNC: 3.8 MMOL/L (ref 3.4–5.3)
PROT SERPL-MCNC: 7.3 G/DL (ref 6.4–8.3)
SODIUM SERPL-SCNC: 140 MMOL/L (ref 135–145)
TRIGL SERPL-MCNC: 52 MG/DL

## 2025-01-22 ENCOUNTER — ANCILLARY PROCEDURE (OUTPATIENT)
Dept: MAMMOGRAPHY | Facility: CLINIC | Age: 70
End: 2025-01-22
Attending: PHYSICIAN ASSISTANT
Payer: COMMERCIAL

## 2025-01-22 DIAGNOSIS — Z12.31 VISIT FOR SCREENING MAMMOGRAM: ICD-10-CM

## 2025-01-22 PROCEDURE — 77067 SCR MAMMO BI INCL CAD: CPT | Mod: TC | Performed by: RADIOLOGY

## 2025-01-22 PROCEDURE — 77063 BREAST TOMOSYNTHESIS BI: CPT | Mod: TC | Performed by: RADIOLOGY

## 2025-01-23 NOTE — PROGRESS NOTES
[facility] Non Surgical Follow up Visit   Date of visit: 1/29/2025  Physician: ROGERIO Adams MD, MD  Primary Care is [facility]  Guillermina Lopez   69 year old  female     Initial Weight: 185#  Initial BMI: 33.84  Today's Weight:   Wt Readings from Last 1 Encounters:   01/29/25 82.6 kg (182 lb)     Body mass index is 33.29 kg/m .           Assessment and Plan   Assessment: Guillermina is a 69 year old year old female who presents for medical weight management.      Plan:    1. Obesity (BMI 30-39.9) (Primary)  Patient was congratulated on her success thus far. Healthy habits to assist with further weight loss were discussed. She will stop eating her meals when she feels satisfied and will pack away or throw away the leftover food so that she doesn't eat again a couple hours later. She will try to get back to her [employer] exercise videos. She will continue the phentermine and will try taking 1/2 tab with breakfast and 1/2 tab with lunch. We discussed the patient's co-morbid conditions including dyslipidemia and OA. These likely will improve with healthy habits and weight loss.     2. Dyslipidemia  This may improve with healthy habits and weight loss.     3. Osteoarthritis of knee, unspecified laterality, unspecified osteoarthritis type  This may improve with healthy habits and weight loss.      Follow up in 4-5 months with myself (in person)            INTERIM HISTORY  Patient is taking phentermine  for appetite and craving control and she thinks that it is helping. She finds she starts getting hungry around dinner time and in the evenings. She denies any side effects from it.      Goals established by patient with dietician 8/19/24:   Aim to have three meals per day  Aim to have 20 grams of protein per meal  Try to create consistency with activity    DIETARY HISTORY  Meals Per Day: 3  Eating Protein First?: sometimes  Food Diary: B:1/2 bagel and banana L:salad or chicken or leftovers, often protein and  vegetable D:protein and vegetable and sometimes starch  Snacks Per Day: if she skips lunch  Typical Snack: nuts, trail mix, fruit  Fluid Intake: water 64 oz  Portion Control: not at dinner  Calorie Containing Beverages: juice a few times a week      She finds that she feels full fast so she will stop eating before finishing but then will go back and finish her plate    Meals at Restaurant per week:rare    Positive Changes Since Last Visit: eating more fruits and vegetables and less starches, decreased portion sizes  Struggling With: exercise, grazing    Knowledgeable in Reading Food Labels: not sure  Getting Adequate Protein: not sure      PHYSICAL ACTIVITY PATTERNS:  She was doing [employer] videos but she has fallen off track, she does run errands at times    REVIEW OF SYSTEMS  GENERAL/CONSTITUTIONAL:  Fatigue: sometimes  HEENT:   glaucoma: no  CARDIOVASCULAR:  History of heart disease: no  GI:  Pancreatitis: Not discussed   NEUROLOGIC:  Paresthesias: no  History of migraine headaches: no  PSYCHIATRIC:  Moods: stable  MUSCULOSKELETAL/RHEUMATOLOGIC  Arthralgias: yes  Myalgias: yes  ENDOCRINE:  Monitoring Blood Sugars: no  Sugars Well Controlled: na  No personal or family history of medullary thyroid cancer Not discussed   :  Birth control: menopause  History of kidney stones: no     Patient Profile   Social History     Social History Narrative    Not on file        Past Medical History   Past Medical History:   Diagnosis Date    Arthritis     Asthma     Hyperlipemia     Shortness of breath     Uncomplicated asthma      Patient Active Problem List   Diagnosis    Acute blood loss anemia    Stomach ulcer due to nonsteroidal anti-inflammatory drug (NSAID)       Past Surgical History  She has a past surgical history that includes orthopedic surgery; orthopedic surgery; orthopedic surgery; colonoscopy; GYN surgery; Phacoemulsification clear cornea with standard intraocular lens implant, endoscopic cyclophotocoagulation,  combined (Right, 7/6/2017); Phacoemulsification clear cornea with standard intraocular lens implant, endoscopic cyclophotocoagulation, combined (Left, 9/7/2017); Arthroplasty knee (Right); tubal ligation; Arthroscopy knee; Colonoscopy; and Pr Hysteroscopy,W/Endo Bx (N/A, 9/30/2020).     Examination   Wt 82.6 kg (182 lb)   BMI 33.29 kg/m    Wt Readings from Last 4 Encounters:   01/29/25 82.6 kg (182 lb)   08/19/24 83.9 kg (185 lb)   03/09/22 85.8 kg (189 lb 3.2 oz)   02/03/22 88 kg (194 lb 1.6 oz)      BP Readings from Last 3 Encounters:   03/09/22 (!) 140/94   02/03/22 (!) 150/80   09/07/17 127/83      GENERAL: alert and no distress  EYES: Eyes grossly normal to inspection.  No discharge or erythema, or obvious scleral/conjunctival abnormalities.  RESP: No audible wheeze, cough, or visible cyanosis.    SKIN: Visible skin clear. No significant rash, abnormal pigmentation or lesions.  NEURO: Cranial nerves grossly intact.  Mentation and speech appropriate for age.  PSYCH: Appropriate affect, tone, and pace of words        Counseling:   We reviewed the important post op bariatric recommendations:  -eating 3 meals daily  -eating protein first, getting >60gm protein daily  -eating slowly, chewing food well  -avoiding/limiting calorie containing beverages  -limiting starchy vegetables and carbohydrates, choosing wheat, not white with breads,   crackers, pastas, aguila, bagels, tortillas, rice  -limiting restaurant or cafeteria eating to twice a week or less    We discussed the importance of restorative sleep and stress management in maintaining a healthy weight.  We discussed the National Weight Control Registry healthy weight maintenance strategies and ways to optimize metabolism.  We discussed the importance of physical activity including cardiovascular and strength training in maintaining a healthier weight.    Total time spent on the date of this encounter doing: chart review, review of test results, patient visit,  physical exam, education, counseling, developing plan of care and documenting = 34 minutes.         ROGERIO Adams MD  [facility]

## 2025-01-29 ENCOUNTER — VIRTUAL VISIT (OUTPATIENT)
Dept: SURGERY | Facility: CLINIC | Age: 70
End: 2025-01-29
Payer: COMMERCIAL

## 2025-01-29 VITALS — WEIGHT: 182 LBS | HEIGHT: 62 IN | BODY MASS INDEX: 33.49 KG/M2

## 2025-01-29 DIAGNOSIS — E78.5 DYSLIPIDEMIA: ICD-10-CM

## 2025-01-29 DIAGNOSIS — E66.09 CLASS 1 OBESITY DUE TO EXCESS CALORIES WITH SERIOUS COMORBIDITY IN ADULT, UNSPECIFIED BMI: ICD-10-CM

## 2025-01-29 DIAGNOSIS — M17.9 OSTEOARTHRITIS OF KNEE, UNSPECIFIED LATERALITY, UNSPECIFIED OSTEOARTHRITIS TYPE: ICD-10-CM

## 2025-01-29 DIAGNOSIS — E66.9 OBESITY (BMI 30-39.9): Primary | ICD-10-CM

## 2025-01-29 DIAGNOSIS — E66.811 CLASS 1 OBESITY DUE TO EXCESS CALORIES WITH SERIOUS COMORBIDITY IN ADULT, UNSPECIFIED BMI: ICD-10-CM

## 2025-01-29 PROCEDURE — 98006 SYNCH AUDIO-VIDEO EST MOD 30: CPT | Performed by: FAMILY MEDICINE

## 2025-01-29 RX ORDER — PHENTERMINE HYDROCHLORIDE 37.5 MG/1
TABLET ORAL
Qty: 90 TABLET | Refills: 0 | Status: SHIPPED | OUTPATIENT
Start: 2025-01-29

## 2025-01-29 NOTE — PROGRESS NOTES
Virtual Visit Details    Type of service:  Video Visit   Video Start Time:  9:20 am  Video End Time:9:39 AM    Originating Location (pt. Location): Home    Distant Location (provider location):  Off-site  Platform used for Video Visit: Well

## 2025-01-29 NOTE — PATIENT INSTRUCTIONS

## 2025-01-29 NOTE — LETTER
1/29/2025      Guillermina Lopez  [address] [place] [address]      Dear Colleague,    Thank you for referring your patient, Guillermina Lopez, to the [facility] [place]. Please see a copy of my visit note below.    Bariatric Care Clinic Non Surgical Follow up Visit   Date of visit: 1/29/2025  Physician: ROGERIO Adams MD, MD  Primary Care is [facility].  Guillermina Lopez   69 year old  female     Initial Weight: 185#  Initial BMI: 33.84  Today's Weight:   Wt Readings from Last 1 Encounters:   01/29/25 82.6 kg (182 lb)     Body mass index is 33.29 kg/m .           Assessment and Plan   Assessment: Guillermina is a 69 year old year old female who presents for medical weight management.      Plan:    1. Obesity (BMI 30-39.9) (Primary)  Patient was congratulated on her success thus far. Healthy habits to assist with further weight loss were discussed. She will stop eating her meals when she feels satisfied and will pack away or throw away the leftover food so that she doesn't eat again a couple hours later. She will try to get back to her [employer] exercise videos. She will continue the phentermine and will try taking 1/2 tab with breakfast and 1/2 tab with lunch. We discussed the patient's co-morbid conditions including dyslipidemia and OA. These likely will improve with healthy habits and weight loss.     2. Dyslipidemia  This may improve with healthy habits and weight loss.     3. Osteoarthritis of knee, unspecified laterality, unspecified osteoarthritis type  This may improve with healthy habits and weight loss.      Follow up in 4-5 months with myself (in person)            INTERIM HISTORY  Patient is taking phentermine  for appetite and craving control and she thinks that it is helping. She finds she starts getting hungry around dinner time and in the evenings. She denies any side effects from it.      Goals established by patient with dietician 8/19/24:   Aim to  have three meals per day  Aim to have 20 grams of protein per meal  Try to create consistency with activity    DIETARY HISTORY  Meals Per Day: 3  Eating Protein First?: sometimes  Food Diary: B:1/2 bagel and banana L:salad or chicken or leftovers, often protein and vegetable D:protein and vegetable and sometimes starch  Snacks Per Day: if she skips lunch  Typical Snack: nuts, trail mix, fruit  Fluid Intake: water 64 oz  Portion Control: not at dinner  Calorie Containing Beverages: juice a few times a week      She finds that she feels full fast so she will stop eating before finishing but then will go back and finish her plate    Meals at Restaurant per week:rare    Positive Changes Since Last Visit: eating more fruits and vegetables and less starches, decreased portion sizes  Struggling With: exercise, grazing    Knowledgeable in Reading Food Labels: not sure  Getting Adequate Protein: not sure      PHYSICAL ACTIVITY PATTERNS:  She was doing [employer] videos but she has fallen off track, she does run errands at times    REVIEW OF SYSTEMS  GENERAL/CONSTITUTIONAL:  Fatigue: sometimes  HEENT:   glaucoma: no  CARDIOVASCULAR:  History of heart disease: no  GI:  Pancreatitis: Not discussed   NEUROLOGIC:  Paresthesias: no  History of migraine headaches: no  PSYCHIATRIC:  Moods: stable  MUSCULOSKELETAL/RHEUMATOLOGIC  Arthralgias: yes  Myalgias: yes  ENDOCRINE:  Monitoring Blood Sugars: no  Sugars Well Controlled: na  No personal or family history of medullary thyroid cancer Not discussed   :  Birth control: menopause  History of kidney stones: no     Patient Profile   Social History     Social History Narrative     Not on file        Past Medical History   Past Medical History:   Diagnosis Date     Arthritis      Asthma      Hyperlipemia      Shortness of breath      Uncomplicated asthma      Patient Active Problem List   Diagnosis     Acute blood loss anemia     Stomach ulcer due to nonsteroidal anti-inflammatory drug  (NSAID)       Past Surgical History  She has a past surgical history that includes orthopedic surgery; orthopedic surgery; orthopedic surgery; colonoscopy; GYN surgery; Phacoemulsification clear cornea with standard intraocular lens implant, endoscopic cyclophotocoagulation, combined (Right, 7/6/2017); Phacoemulsification clear cornea with standard intraocular lens implant, endoscopic cyclophotocoagulation, combined (Left, 9/7/2017); Arthroplasty knee (Right); tubal ligation; Arthroscopy knee; Colonoscopy; and Pr Hysteroscopy,W/Endo Bx (N/A, 9/30/2020).     Examination   Wt 82.6 kg (182 lb)   BMI 33.29 kg/m    Wt Readings from Last 4 Encounters:   01/29/25 82.6 kg (182 lb)   08/19/24 83.9 kg (185 lb)   03/09/22 85.8 kg (189 lb 3.2 oz)   02/03/22 88 kg (194 lb 1.6 oz)      BP Readings from Last 3 Encounters:   03/09/22 (!) 140/94   02/03/22 (!) 150/80   09/07/17 127/83      GENERAL: alert and no distress  EYES: Eyes grossly normal to inspection.  No discharge or erythema, or obvious scleral/conjunctival abnormalities.  RESP: No audible wheeze, cough, or visible cyanosis.    SKIN: Visible skin clear. No significant rash, abnormal pigmentation or lesions.  NEURO: Cranial nerves grossly intact.  Mentation and speech appropriate for age.  PSYCH: Appropriate affect, tone, and pace of words        Counseling:   We reviewed the important post op bariatric recommendations:  -eating 3 meals daily  -eating protein first, getting >60gm protein daily  -eating slowly, chewing food well  -avoiding/limiting calorie containing beverages  -limiting starchy vegetables and carbohydrates, choosing wheat, not white with breads,   crackers, pastas, aguila, bagels, tortillas, rice  -limiting restaurant or cafeteria eating to twice a week or less    We discussed the importance of restorative sleep and stress management in maintaining a healthy weight.  We discussed the National Weight Control Registry healthy weight maintenance strategies  and ways to optimize metabolism.  We discussed the importance of physical activity including cardiovascular and strength training in maintaining a healthier weight.    Total time spent on the date of this encounter doing: chart review, review of test results, patient visit, physical exam, education, counseling, developing plan of care and documenting = 34 minutes.         ROGERIO Adams MD  [facility]           Virtual Visit Details    Type of service:  Video Visit   Video Start Time:  9:20 am  Video End Time:9:39 AM    Originating Location (pt. Location): Home    Distant Location (provider location):  Off-site  Platform used for Video Visit: Well          Again, thank you for allowing me to participate in the care of your patient.        Sincerely,        ROGERIO Adams MD    Electronically signed

## 2025-01-29 NOTE — NURSING NOTE
Current patient location: [address] [place] [address]    Is the patient currently in the state of [place]? YES    Visit mode:VIDEO    If the visit is dropped, the patient can be reconnected by: VIDEO VISIT: Text to cell phone:   Telephone Information:   Mobile [phone]       Will anyone else be joining the visit? NO  (If patient encounters technical issues they should call [phone]    Are changes needed to the allergy or medication list? No    Are refills needed on medications prescribed by this physician? YES phentermine    Reason for visit: RECHECK    Funmi GOULD

## 2025-05-15 DIAGNOSIS — E66.09 CLASS 1 OBESITY DUE TO EXCESS CALORIES WITH SERIOUS COMORBIDITY IN ADULT, UNSPECIFIED BMI: ICD-10-CM

## 2025-05-15 DIAGNOSIS — E66.811 CLASS 1 OBESITY DUE TO EXCESS CALORIES WITH SERIOUS COMORBIDITY IN ADULT, UNSPECIFIED BMI: ICD-10-CM

## 2025-05-15 RX ORDER — PHENTERMINE HYDROCHLORIDE 37.5 MG/1
TABLET ORAL
Qty: 90 TABLET | Refills: 0 | Status: SHIPPED | OUTPATIENT
Start: 2025-05-15

## 2025-07-01 NOTE — PROGRESS NOTES
Bariatric Care Clinic Non Surgical Follow up Visit   Date of visit: 7/8/2025  Physician: ROGERIO Adams MD, MD  Primary Care is [facility]  Guillermina Lopez   70 year old  female     Initial Weight: 185#  Initial BMI: 33.84  Today's Weight:   Wt Readings from Last 1 Encounters:   07/08/25 82.6 kg (182 lb 3.2 oz)     Body mass index is 33.32 kg/m .           Assessment and Plan   Assessment: Guillermina is a 70 year old year old female who presents for medical weight management.      Plan:    1. Obesity (BMI 30-39.9) (Primary)  Patient was congratulated on her success thus far. Healthy habits to assist with further weight loss were discussed. She will try adding some muscle building activity. She will continue the phentermine. We discussed the patient's co-morbid conditions including OA and dyslipidemia. These likely will improve with healthy habits and weight loss.     2. Dyslipidemia  This may improve with healthy habits and weight loss.     3. Osteoarthritis of knee, unspecified laterality, unspecified osteoarthritis type  This may improve with healthy habits and weight loss.      Follow up next available with myself            INTERIM HISTORY  Patient started splitting her phentermine to 1/2 tab with breakfast and 1/2 tab with lunch after her last visit in January. She thinks it is helping. She denies side effects.    DIETARY HISTORY  Meals Per Day: 2-3  Eating Protein First?: yes  Food Diary: B:skips or sausage or 1/2 bagel with peanut butter and fruit, coffee with creamer (no sugar) L:leftovers D:salmon or chicken and vegetables and sometimes starch  Snacks Per Day: sometimes  Typical Snack: chips   Fluid Intake: 64 oz  Portion Control: yes  Calorie Containing Beverages: simply lemonade occasionally    Meals at Restaurant per week:0-1    Positive Changes Since Last Visit: limited starches  Struggling With: none    Knowledgeable in Reading Food Labels: yes  Getting Adequate Protein: working on  "it      PHYSICAL ACTIVITY PATTERNS:  Walking 10,000 steps per day    REVIEW OF SYSTEMS  GENERAL/CONSTITUTIONAL:  Fatigue: no  HEENT:   glaucoma: no  CARDIOVASCULAR:  History of heart disease: no  GI:  Pancreatitis: no  NEUROLOGIC:  Paresthesias: no  History of migraine headaches: no  PSYCHIATRIC:  Moods: stable  MUSCULOSKELETAL/RHEUMATOLOGIC  Arthralgias: yes  Myalgias: yes  ENDOCRINE:  Monitoring Blood Sugars: no  Sugars Well Controlled: na  No personal or family history of medullary thyroid cancer Not discussed   :  Birth control: menopause  History of kidney stones: no     Patient Profile   Social History     Social History Narrative    Not on file        Past Medical History   Past Medical History:   Diagnosis Date    Arthritis     Asthma     Hyperlipemia     Shortness of breath     Uncomplicated asthma      Patient Active Problem List   Diagnosis    Acute blood loss anemia    Stomach ulcer due to nonsteroidal anti-inflammatory drug (NSAID)       Past Surgical History  She has a past surgical history that includes orthopedic surgery; orthopedic surgery; orthopedic surgery; colonoscopy; GYN surgery; Phacoemulsification clear cornea with standard intraocular lens implant, endoscopic cyclophotocoagulation, combined (Right, 7/6/2017); Phacoemulsification clear cornea with standard intraocular lens implant, endoscopic cyclophotocoagulation, combined (Left, 9/7/2017); Arthroplasty knee (Right); tubal ligation; Arthroscopy knee; Colonoscopy; and Pr Hysteroscopy,W/Endo Bx (N/A, 9/30/2020).     Examination   /72 (BP Location: Right arm)   Ht 1.575 m (5' 2\")   Wt 82.6 kg (182 lb 3.2 oz)   BMI 33.32 kg/m    Wt Readings from Last 4 Encounters:   07/08/25 82.6 kg (182 lb 3.2 oz)   01/29/25 82.6 kg (182 lb)   08/19/24 83.9 kg (185 lb)   03/09/22 85.8 kg (189 lb 3.2 oz)      BP Readings from Last 3 Encounters:   07/08/25 138/72   03/09/22 (!) 140/94   02/03/22 (!) 150/80      GEN: Alert and oriented in no acute " distress.   HEENT: mucous membranes moist  CV: RRR no MRG         Counseling:   We reviewed the important post op bariatric recommendations:  -eating 3 meals daily  -eating protein first, getting >60gm protein daily  -eating slowly, chewing food well  -avoiding/limiting calorie containing beverages  -limiting starchy vegetables and carbohydrates, choosing wheat, not white with breads,   crackers, pastas, aguila, bagels, tortillas, rice  -limiting restaurant or cafeteria eating to twice a week or less    We discussed the importance of restorative sleep and stress management in maintaining a healthy weight.  We discussed the National Weight Control Registry healthy weight maintenance strategies and ways to optimize metabolism.  We discussed the importance of physical activity including cardiovascular and strength training in maintaining a healthier weight.    Total time spent on the date of this encounter doing: chart review, review of test results, patient visit, physical exam, education, counseling, developing plan of care and documenting = 30 minutes.         ROGERIO Adams MD  [facility]

## 2025-07-08 ENCOUNTER — OFFICE VISIT (OUTPATIENT)
Dept: SURGERY | Facility: CLINIC | Age: 70
End: 2025-07-08
Payer: COMMERCIAL

## 2025-07-08 VITALS
SYSTOLIC BLOOD PRESSURE: 138 MMHG | BODY MASS INDEX: 33.53 KG/M2 | HEIGHT: 62 IN | DIASTOLIC BLOOD PRESSURE: 72 MMHG | WEIGHT: 182.2 LBS

## 2025-07-08 DIAGNOSIS — E66.9 OBESITY (BMI 30-39.9): Primary | ICD-10-CM

## 2025-07-08 DIAGNOSIS — M17.9 OSTEOARTHRITIS OF KNEE, UNSPECIFIED LATERALITY, UNSPECIFIED OSTEOARTHRITIS TYPE: ICD-10-CM

## 2025-07-08 DIAGNOSIS — E78.5 DYSLIPIDEMIA: ICD-10-CM

## 2025-07-08 PROCEDURE — 99214 OFFICE O/P EST MOD 30 MIN: CPT | Performed by: FAMILY MEDICINE

## 2025-07-08 PROCEDURE — 3075F SYST BP GE 130 - 139MM HG: CPT | Performed by: FAMILY MEDICINE

## 2025-07-08 PROCEDURE — 3078F DIAST BP <80 MM HG: CPT | Performed by: FAMILY MEDICINE

## 2025-07-08 RX ORDER — MAGNESIUM OXIDE 400 MG/1
400 TABLET ORAL
COMMUNITY

## 2025-07-08 RX ORDER — CLOBETASOL PROPIONATE 0.5 MG/ML
SOLUTION TOPICAL
COMMUNITY
Start: 2025-02-20

## 2025-07-08 RX ORDER — CALCIPOTRIENE 0.05 MG/ML
SOLUTION TOPICAL
COMMUNITY
Start: 2025-02-20

## 2025-07-08 RX ORDER — FLUOCINOLONE ACETONIDE 0.11 MG/ML
OIL TOPICAL
COMMUNITY
Start: 2025-05-20

## 2025-07-08 NOTE — LETTER
7/8/2025      Guillermina Lopez  [address] [place] [address]      Dear Colleague,    Thank you for referring your patient, Guillermina Lopez, to the [facility]. Please see a copy of my visit note below.    [facility] Non Surgical Follow up Visit   Date of visit: 7/8/2025  Physician: ROGERIO Adams MD, MD  Primary Care is [facility].  Guillermina Lopez   70 year old  female     Initial Weight: 185#  Initial BMI: 33.84  Today's Weight:   Wt Readings from Last 1 Encounters:   07/08/25 82.6 kg (182 lb 3.2 oz)     Body mass index is 33.32 kg/m .           Assessment and Plan   Assessment: Guillermina is a 70 year old year old female who presents for medical weight management.      Plan:    1. Obesity (BMI 30-39.9) (Primary)  Patient was congratulated on her success thus far. Healthy habits to assist with further weight loss were discussed. She will try adding some muscle building activity. She will continue the phentermine. We discussed the patient's co-morbid conditions including OA and dyslipidemia. These likely will improve with healthy habits and weight loss.     2. Dyslipidemia  This may improve with healthy habits and weight loss.     3. Osteoarthritis of knee, unspecified laterality, unspecified osteoarthritis type  This may improve with healthy habits and weight loss.      Follow up next available with myself            INTERIM HISTORY  Patient started splitting her phentermine to 1/2 tab with breakfast and 1/2 tab with lunch after her last visit in January. She thinks it is helping. She denies side effects.    DIETARY HISTORY  Meals Per Day: 2-3  Eating Protein First?: yes  Food Diary: B:skips or sausage or 1/2 bagel with peanut butter and fruit, coffee with creamer (no sugar) L:leftovers D:salmon or chicken and vegetables and sometimes starch  Snacks Per Day: sometimes  Typical Snack: chips   Fluid Intake: 64 oz  Portion Control: yes  Calorie  "Containing Beverages: simply lemonade occasionally    Meals at Restaurant per week:0-1    Positive Changes Since Last Visit: limited starches  Struggling With: none    Knowledgeable in Reading Food Labels: yes  Getting Adequate Protein: working on it      PHYSICAL ACTIVITY PATTERNS:  Walking 10,000 steps per day    REVIEW OF SYSTEMS  GENERAL/CONSTITUTIONAL:  Fatigue: no  HEENT:   glaucoma: no  CARDIOVASCULAR:  History of heart disease: no  GI:  Pancreatitis: no  NEUROLOGIC:  Paresthesias: no  History of migraine headaches: no  PSYCHIATRIC:  Moods: stable  MUSCULOSKELETAL/RHEUMATOLOGIC  Arthralgias: yes  Myalgias: yes  ENDOCRINE:  Monitoring Blood Sugars: no  Sugars Well Controlled: na  No personal or family history of medullary thyroid cancer Not discussed   :  Birth control: menopause  History of kidney stones: no     Patient Profile   Social History     Social History Narrative     Not on file        Past Medical History   Past Medical History:   Diagnosis Date     Arthritis      Asthma      Hyperlipemia      Shortness of breath      Uncomplicated asthma      Patient Active Problem List   Diagnosis     Acute blood loss anemia     Stomach ulcer due to nonsteroidal anti-inflammatory drug (NSAID)       Past Surgical History  She has a past surgical history that includes orthopedic surgery; orthopedic surgery; orthopedic surgery; colonoscopy; GYN surgery; Phacoemulsification clear cornea with standard intraocular lens implant, endoscopic cyclophotocoagulation, combined (Right, 7/6/2017); Phacoemulsification clear cornea with standard intraocular lens implant, endoscopic cyclophotocoagulation, combined (Left, 9/7/2017); Arthroplasty knee (Right); tubal ligation; Arthroscopy knee; Colonoscopy; and Pr Hysteroscopy,W/Endo Bx (N/A, 9/30/2020).     Examination   /72 (BP Location: Right arm)   Ht 1.575 m (5' 2\")   Wt 82.6 kg (182 lb 3.2 oz)   BMI 33.32 kg/m    Wt Readings from Last 4 Encounters:   07/08/25 82.6 kg " (182 lb 3.2 oz)   01/29/25 82.6 kg (182 lb)   08/19/24 83.9 kg (185 lb)   03/09/22 85.8 kg (189 lb 3.2 oz)      BP Readings from Last 3 Encounters:   07/08/25 138/72   03/09/22 (!) 140/94   02/03/22 (!) 150/80      GEN: Alert and oriented in no acute distress.   HEENT: mucous membranes moist  CV: RRR no MRG         Counseling:   We reviewed the important post op bariatric recommendations:  -eating 3 meals daily  -eating protein first, getting >60gm protein daily  -eating slowly, chewing food well  -avoiding/limiting calorie containing beverages  -limiting starchy vegetables and carbohydrates, choosing wheat, not white with breads,   crackers, pastas, aguila, bagels, tortillas, rice  -limiting restaurant or cafeteria eating to twice a week or less    We discussed the importance of restorative sleep and stress management in maintaining a healthy weight.  We discussed the National Weight Control Registry healthy weight maintenance strategies and ways to optimize metabolism.  We discussed the importance of physical activity including cardiovascular and strength training in maintaining a healthier weight.    Total time spent on the date of this encounter doing: chart review, review of test results, patient visit, physical exam, education, counseling, developing plan of care and documenting = 30 minutes.         ROGERIO Adams MD  [facility]             Again, thank you for allowing me to participate in the care of your patient.        Sincerely,        ROGERIO Adams MD    Electronically signed

## 2025-07-08 NOTE — PATIENT INSTRUCTIONS
Eat Better ? Move More ? Live Well    Eat 3 nutrient-rich meals each day    Don t skip meals--it will cause you to overeat later in the day!    Eating fiber (vegetables/fruits/whole grains) and protein with meals helps you stay full longer    Choose foods with less than 10 grams of sugar and 5 grams of fat per serving to prevent excess calories and weight gain  Eat around the same times each day to develop a routine eating schedule   Avoid snacking unless physically hungry.   Planned snacks: 1-2 times per day and no more than 150 calories    Eat protein first   Protein helps with healing, maintaining adequate muscle mass, reducing hunger and optimizing nutritional status   Aim for 60-80 grams of protein per day   Fill up on Fiber   Fiber comes from plants--fruits, veggies, whole grains, nuts/seeds and beans   Fiber is low in calories, high in phytonutrients and helps you stay full longer   Aim for 25-35 grams per day by eating fiber with meals and snacks  Eat [id number]   Take 20-30 minutes to eat each meal by taking small bites, chewing foods to applesauce consistency or 20-30 times before you swallow   Eating foods too fast can delay satiety/fullness signals and increase overeating   Slow down your eating by using toddler utensils, putting your fork/spoon down between bites and not watching TV or emailing during meals!   Keep a Journal         Writing down what you eat, how you feel and when you are active helps you identify new changes to work on from week to week         Look for ways to cut 100 calories from your current diet 2-3 times per day  Drink 64 ounces of 0-Calorie drinks between meals   Water   Zero calorie [employer]el  or Vitamin Water     [employer]  Zero Calories   Crystal Light , Sugar-Free Omero-Aid , and other sugar-free lemonade or flavored coates   Keep Caffeine to less than 300mg per day ie: 3-6oz cups coffee     Work up to 45-60 minutes of physical activity most days of the week   Helps with  losing weight and prevent regaining those extra pounds!    Do a combo of cardio (walking/water exercises) and strength training (lifting weights/Vinyasa yoga)    Avoid Mindless Eating   Be present when you eat--take note of the smell, taste and quality of your food   Make a list of alternative activities you could do to prevent eating out of boredom/stress  Go for a walk, call a friend, chew gum, paint your nails, re-organize the garage, etc           Here are the [employer] exercise sites:    Yoga-https://www.[employer].FancyBox/user/yogawithadriene    Body strength activities, dance, high intensity interval training- https://www.[employer].FancyBox/user/popsugartvfit    Strength training- https://www.[employer].FancyBox/user/KozakSportsPerform    Walking- https://www.[employer]ancyBox/user/walkathomemedia    Sit and Be Fit- https://www.[employer].FancyBox/channel/[email or web address]_g    Low impact cardio- Beryl Lezama- https://www.[employer].com/channel/SViyAWvLozsZoaqH5odfpuvK    Cardio Ynes Jolley- https://www.[employer].com/channel/NKDvMeym7ZQqOMLPE0hCwWKv    30 Min low impact cardio- https://www.HaulerDealsube.com/watch?v=gC_L9qAHVJ8    Body Project- https://www.[employer].FancyBox/channel/[email or web address]

## (undated) DEVICE — GLOVE PROTEXIS MICRO 7.0  2D73PM70

## (undated) DEVICE — EYE PACK BVI READYPAK KIT #1

## (undated) DEVICE — SU ETHILON 10-0 CS160-6 12" 9000G

## (undated) DEVICE — TAPE MICROPORE 1"X1.5YD 1530S-1

## (undated) DEVICE — EYE SHIELD PLASTIC

## (undated) DEVICE — SEALANT OCULAR RESURE RS-1004-US-10

## (undated) DEVICE — PACK CATARACT CUSTOM SO DALE SEY32CTFCX

## (undated) DEVICE — EYE SOL BSS 500ML

## (undated) DEVICE — EYE DRSG PAD OVAL

## (undated) DEVICE — TAPE MICROPORE 2"X1.5YD 1530S-2

## (undated) DEVICE — GLOVE PROTEXIS MICRO 6.0  2D73PM60

## (undated) DEVICE — LINEN TOWEL PACK X5 5464

## (undated) DEVICE — EYE KNIFE SLIT XSTAR VISITEC 2.4MM 45DEG BEVEL UP 373724

## (undated) DEVICE — EYE PACK CUSTOM ANTERIOR 30DEG TIP CENTURION PPK6682-04

## (undated) RX ORDER — DEXAMETHASONE SODIUM PHOSPHATE 4 MG/ML
INJECTION, SOLUTION INTRA-ARTICULAR; INTRALESIONAL; INTRAMUSCULAR; INTRAVENOUS; SOFT TISSUE
Status: DISPENSED
Start: 2017-09-07

## (undated) RX ORDER — ONDANSETRON 2 MG/ML
INJECTION INTRAMUSCULAR; INTRAVENOUS
Status: DISPENSED
Start: 2017-09-07

## (undated) RX ORDER — ACETAMINOPHEN 325 MG/1
TABLET ORAL
Status: DISPENSED
Start: 2017-07-06

## (undated) RX ORDER — FENTANYL CITRATE 50 UG/ML
INJECTION, SOLUTION INTRAMUSCULAR; INTRAVENOUS
Status: DISPENSED
Start: 2017-07-06

## (undated) RX ORDER — WATER 10 ML/10ML
INJECTION INTRAMUSCULAR; INTRAVENOUS; SUBCUTANEOUS
Status: DISPENSED
Start: 2017-09-07

## (undated) RX ORDER — ONDANSETRON 2 MG/ML
INJECTION INTRAMUSCULAR; INTRAVENOUS
Status: DISPENSED
Start: 2017-07-06

## (undated) RX ORDER — TETRACAINE HYDROCHLORIDE 5 MG/ML
SOLUTION OPHTHALMIC
Status: DISPENSED
Start: 2017-09-07

## (undated) RX ORDER — CEFAZOLIN SODIUM 500 MG/2.2ML
INJECTION, POWDER, FOR SOLUTION INTRAMUSCULAR; INTRAVENOUS
Status: DISPENSED
Start: 2017-09-07

## (undated) RX ORDER — LIDOCAINE HYDROCHLORIDE 10 MG/ML
INJECTION, SOLUTION EPIDURAL; INFILTRATION; INTRACAUDAL; PERINEURAL
Status: DISPENSED
Start: 2017-09-07